# Patient Record
Sex: FEMALE | Race: OTHER | Employment: FULL TIME | URBAN - METROPOLITAN AREA
[De-identification: names, ages, dates, MRNs, and addresses within clinical notes are randomized per-mention and may not be internally consistent; named-entity substitution may affect disease eponyms.]

---

## 2017-03-01 ENCOUNTER — TELEPHONE (OUTPATIENT)
Dept: OBGYN CLINIC | Facility: CLINIC | Age: 37
End: 2017-03-01

## 2017-03-01 NOTE — TELEPHONE ENCOUNTER
Pt confirms message below and LMP of 2/1/17. Pt stated she is currently taking PNV with DHA. Pt had miscarriage in November 2016 and was given the option of doing OBN PC appt or in person appt. Pt stated she will do in person appt.  Pt accepted appt on 3/25

## 2017-03-14 ENCOUNTER — APPOINTMENT (OUTPATIENT)
Dept: LAB | Facility: HOSPITAL | Age: 37
End: 2017-03-14
Attending: OBSTETRICS & GYNECOLOGY
Payer: COMMERCIAL

## 2017-03-14 ENCOUNTER — TELEPHONE (OUTPATIENT)
Dept: OBGYN CLINIC | Facility: CLINIC | Age: 37
End: 2017-03-14

## 2017-03-14 ENCOUNTER — PATIENT MESSAGE (OUTPATIENT)
Dept: OBGYN CLINIC | Facility: CLINIC | Age: 37
End: 2017-03-14

## 2017-03-14 DIAGNOSIS — O26.859 SPOTTING IN EARLY PREGNANCY: Primary | ICD-10-CM

## 2017-03-14 DIAGNOSIS — O26.859 SPOTTING IN EARLY PREGNANCY: ICD-10-CM

## 2017-03-14 LAB — B-HCG SERPL-ACNC: NORMAL MIU/ML

## 2017-03-14 PROCEDURE — 84702 CHORIONIC GONADOTROPIN TEST: CPT

## 2017-03-14 PROCEDURE — 36415 COLL VENOUS BLD VENIPUNCTURE: CPT

## 2017-03-14 NOTE — TELEPHONE ENCOUNTER
From: Molly Kendall  To: Christiano Rooney MD  Sent: 3/14/2017 4:57 PM CDT  Subject: Other    I spoke to the Nurse earlier and she said she was going to ask the on call doctor if i needed to go in for blood work. If I can I would like to do it today.  Please

## 2017-03-14 NOTE — TELEPHONE ENCOUNTER
Pt calling to report brown vaginal spotting after urinating x 2 today. LMP 2/1/17. Pt denies cramping. Pt had D&C in December 2016 for missed Ab. Blood type A+. Routed to [facility] EMERGENCY [facility] on call to please advise. Quants x 2?

## 2017-03-14 NOTE — TELEPHONE ENCOUNTER
From: Buddy Menon  To: Jose Greenfield MD  Sent: 3/14/2017 2:48 PM CDT  Subject: Other    Im spotting a little and I'll be 6 weeks pregnant tomorrow. Is that normal? I'm just more worried since I had the miscarriage back in December.  Thanks

## 2017-03-15 NOTE — TELEPHONE ENCOUNTER
OB US ordered, pt notified and central scheduling # given. Pt to call the day after US for results. Quant cancelled.

## 2017-03-20 ENCOUNTER — HOSPITAL ENCOUNTER (OUTPATIENT)
Dept: ULTRASOUND IMAGING | Facility: HOSPITAL | Age: 37
Discharge: HOME | End: 2017-03-20
Attending: OBSTETRICS & GYNECOLOGY
Payer: COMMERCIAL

## 2017-03-20 DIAGNOSIS — O26.859 SPOTTING IN EARLY PREGNANCY: ICD-10-CM

## 2017-03-20 PROCEDURE — 76801 OB US < 14 WKS SINGLE FETUS: CPT

## 2017-03-20 PROCEDURE — 76817 TRANSVAGINAL US OBSTETRIC: CPT

## 2017-03-21 ENCOUNTER — TELEPHONE (OUTPATIENT)
Dept: OBGYN CLINIC | Facility: CLINIC | Age: 37
End: 2017-03-21

## 2017-03-21 NOTE — TELEPHONE ENCOUNTER
Pt informed US is not resulted yet. Pt advised to call tomorrow or to discuss at appt on Saturday. Pt verbalizes understanding.

## 2017-03-22 ENCOUNTER — PATIENT MESSAGE (OUTPATIENT)
Dept: OBGYN CLINIC | Facility: CLINIC | Age: 37
End: 2017-03-22

## 2017-03-22 ENCOUNTER — TELEPHONE (OUTPATIENT)
Dept: OBGYN CLINIC | Facility: CLINIC | Age: 37
End: 2017-03-22

## 2017-03-22 NOTE — TELEPHONE ENCOUNTER
From: Jose Luis Centeno  To: Darrian Estrada MD  Sent: 3/22/2017 8:27 AM CDT  Subject: Test Results Question    I was wondering if you have my results for my Ultrasound yet? I called yesterday and they told me to check today.

## 2017-03-22 NOTE — TELEPHONE ENCOUNTER
Pt informed of OB US results, viable IUP measuring 6w5d. Pt has OBN scheduled and denies VB and cramping. Will route to JOCE on call for sign off and any further recs. Pt has no further questions.

## 2017-03-25 ENCOUNTER — LAB ENCOUNTER (OUTPATIENT)
Dept: LAB | Facility: HOSPITAL | Age: 37
End: 2017-03-25
Attending: OBSTETRICS & GYNECOLOGY
Payer: COMMERCIAL

## 2017-03-25 ENCOUNTER — NURSE ONLY (OUTPATIENT)
Dept: OBGYN CLINIC | Facility: CLINIC | Age: 37
End: 2017-03-25

## 2017-03-25 ENCOUNTER — TELEPHONE (OUTPATIENT)
Dept: OBGYN CLINIC | Facility: CLINIC | Age: 37
End: 2017-03-25

## 2017-03-25 VITALS — BODY MASS INDEX: 28.96 KG/M2 | WEIGHT: 173.81 LBS | HEIGHT: 65 IN

## 2017-03-25 DIAGNOSIS — Z34.81 ENCOUNTER FOR SUPERVISION OF OTHER NORMAL PREGNANCY IN FIRST TRIMESTER: Primary | ICD-10-CM

## 2017-03-25 DIAGNOSIS — Z34.81 ENCOUNTER FOR SUPERVISION OF OTHER NORMAL PREGNANCY IN FIRST TRIMESTER: ICD-10-CM

## 2017-03-25 LAB
ANTIBODY SCREEN: NEGATIVE
BASOPHILS # BLD: 0 K/UL (ref 0–0.2)
BASOPHILS NFR BLD: 0 %
EOSINOPHIL # BLD: 0.1 K/UL (ref 0–0.7)
EOSINOPHIL NFR BLD: 1 %
ERYTHROCYTE [DISTWIDTH] IN BLOOD BY AUTOMATED COUNT: 12.3 % (ref 11–15)
GLUCOSE 1H P 50 G GLC PO SERPL-MCNC: 144 MG/DL
HCT VFR BLD AUTO: 39.5 % (ref 35–48)
HGB BLD-MCNC: 13.5 G/DL (ref 12–16)
LYMPHOCYTES # BLD: 1.5 K/UL (ref 1–4)
LYMPHOCYTES NFR BLD: 16 %
MCH RBC QN AUTO: 31 PG (ref 27–32)
MCHC RBC AUTO-ENTMCNC: 34.1 G/DL (ref 32–37)
MCV RBC AUTO: 90.8 FL (ref 80–100)
MONOCYTES # BLD: 0.4 K/UL (ref 0–1)
MONOCYTES NFR BLD: 5 %
NEUTROPHILS # BLD AUTO: 7.3 K/UL (ref 1.8–7.7)
NEUTROPHILS NFR BLD: 79 %
PLATELET # BLD AUTO: 185 K/UL (ref 140–400)
PMV BLD AUTO: 9.1 FL (ref 7.4–10.3)
RBC # BLD AUTO: 4.35 M/UL (ref 3.7–5.4)
RH BLOOD TYPE: POSITIVE
RUBV IGG SER-ACNC: 16.3 IU/ML
WBC # BLD AUTO: 9.4 K/UL (ref 4–11)

## 2017-03-25 PROCEDURE — 86803 HEPATITIS C AB TEST: CPT

## 2017-03-25 PROCEDURE — 86900 BLOOD TYPING SEROLOGIC ABO: CPT

## 2017-03-25 PROCEDURE — 87389 HIV-1 AG W/HIV-1&-2 AB AG IA: CPT

## 2017-03-25 PROCEDURE — 82950 GLUCOSE TEST: CPT

## 2017-03-25 PROCEDURE — 36415 COLL VENOUS BLD VENIPUNCTURE: CPT

## 2017-03-25 PROCEDURE — 85025 COMPLETE CBC W/AUTO DIFF WBC: CPT

## 2017-03-25 PROCEDURE — 86850 RBC ANTIBODY SCREEN: CPT

## 2017-03-25 PROCEDURE — 86780 TREPONEMA PALLIDUM: CPT

## 2017-03-25 PROCEDURE — 86762 RUBELLA ANTIBODY: CPT

## 2017-03-25 PROCEDURE — 87086 URINE CULTURE/COLONY COUNT: CPT

## 2017-03-25 PROCEDURE — 86901 BLOOD TYPING SEROLOGIC RH(D): CPT

## 2017-03-25 PROCEDURE — 87340 HEPATITIS B SURFACE AG IA: CPT

## 2017-03-25 NOTE — PROGRESS NOTES
Pt seen for OBN appt today with no complaints. Normal PN labs ordered, plus hep c & 1 hr GTT. Pt advised all labs must be completed and resulted prior to MD appt. Pt accepted new OB appt with SHAYY on 4/10. Pt had some vaginal spotting last week.  Pt ha No    Guzman-Sachs Disease No    Thalassemia No    Other inherited genetic or chromosomal disorders No    Patient or baby's father had a child with birth defects not listed above No    Previous miscarriages or stillborn Yes-maternal aunt had 3 miscarriages.

## 2017-03-25 NOTE — TELEPHONE ENCOUNTER
Pt was seen today for OBN appt and is interested in the FTS. Pt stated she plans on having PN labs completed today.

## 2017-03-27 ENCOUNTER — TELEPHONE (OUTPATIENT)
Dept: OBGYN CLINIC | Facility: CLINIC | Age: 37
End: 2017-03-27

## 2017-03-27 DIAGNOSIS — R73.9 BLOOD GLUCOSE ELEVATED: Primary | ICD-10-CM

## 2017-03-27 LAB
HBV SURFACE AG SERPL QL IA: NONREACTIVE
HCV AB SERPL QL IA: NONREACTIVE
HIV1+2 AB SERPL QL IA: NONREACTIVE
T PALLIDUM AB SER QL: NEGATIVE

## 2017-03-27 NOTE — TELEPHONE ENCOUNTER
Notes Recorded by Johnny Moore MD on 3/27/2017 at 4:28 PM  1 hr glucola 144-- needs 3 hr GTT      Pt informed of JLKs recs and verbalized understanding.  Pt stated that she is on the train and is asking for number for central scheduling be sent to her via My

## 2017-03-28 NOTE — TELEPHONE ENCOUNTER
Patient informed order for fts was routed to [facility] SURGICAL [facility] and she is to call the office if [facility] does not call to schedule her within 1-3 business days.

## 2017-03-29 ENCOUNTER — PATIENT MESSAGE (OUTPATIENT)
Dept: OBGYN CLINIC | Facility: CLINIC | Age: 37
End: 2017-03-29

## 2017-04-02 ENCOUNTER — LAB ENCOUNTER (OUTPATIENT)
Dept: LAB | Facility: HOSPITAL | Age: 37
End: 2017-04-02
Attending: OBSTETRICS & GYNECOLOGY
Payer: COMMERCIAL

## 2017-04-02 DIAGNOSIS — R73.9 BLOOD GLUCOSE ELEVATED: ICD-10-CM

## 2017-04-02 PROCEDURE — 36415 COLL VENOUS BLD VENIPUNCTURE: CPT

## 2017-04-02 PROCEDURE — 82951 GLUCOSE TOLERANCE TEST (GTT): CPT

## 2017-04-02 PROCEDURE — 82952 GTT-ADDED SAMPLES: CPT

## 2017-04-10 ENCOUNTER — INITIAL PRENATAL (OUTPATIENT)
Dept: OBGYN CLINIC | Facility: CLINIC | Age: 37
End: 2017-04-10

## 2017-04-10 VITALS
SYSTOLIC BLOOD PRESSURE: 127 MMHG | HEART RATE: 96 BPM | BODY MASS INDEX: 29 KG/M2 | DIASTOLIC BLOOD PRESSURE: 84 MMHG | WEIGHT: 175 LBS

## 2017-04-10 DIAGNOSIS — Z12.4 SCREENING FOR MALIGNANT NEOPLASM OF CERVIX: ICD-10-CM

## 2017-04-10 DIAGNOSIS — Z34.91 ENCOUNTER FOR SUPERVISION OF NORMAL PREGNANCY IN FIRST TRIMESTER, UNSPECIFIED GRAVIDITY: Primary | ICD-10-CM

## 2017-04-10 DIAGNOSIS — A64 STD (SEXUALLY TRANSMITTED DISEASE): ICD-10-CM

## 2017-04-10 PROBLEM — O09.519 AMA (ADVANCED MATERNAL AGE) PRIMIGRAVIDA 35+: Status: ACTIVE | Noted: 2017-04-10

## 2017-04-13 ENCOUNTER — PATIENT MESSAGE (OUTPATIENT)
Dept: OBGYN CLINIC | Facility: CLINIC | Age: 37
End: 2017-04-13

## 2017-04-14 NOTE — TELEPHONE ENCOUNTER
From: Angelika Riggins  To: Tracy Grigsby MD  Sent: 4/13/2017 9:10 PM CDT  Subject: Test Results Question    I have a question about    Urinalysis w/o   scope resulted on 4/10/17 at 6:45 PM.    i see that my leukocyte detected a trace, what does this mean?  th

## 2017-04-19 ENCOUNTER — PATIENT MESSAGE (OUTPATIENT)
Dept: OBGYN CLINIC | Facility: CLINIC | Age: 37
End: 2017-04-19

## 2017-04-21 ENCOUNTER — TELEPHONE (OUTPATIENT)
Dept: OBGYN CLINIC | Facility: CLINIC | Age: 37
End: 2017-04-21

## 2017-04-21 NOTE — TELEPHONE ENCOUNTER
The office is returning a nurse's call. They would like to know what the pt was diagnosed with. Please advise.

## 2017-04-21 NOTE — TELEPHONE ENCOUNTER
I SPOKE WITH THE PT AND SHE CONFIRMED SHE NEVER HAD ANY OF THE CONDITIONS LISTED UNDER PERTINENT NEGATIVES. LEFT MESSAGE FOR [employer] INDICATING THE PERTINENT NEGATIVES WERE REMOVED FROM THE CHART BECAUSE THEY ARE TRUE BUT MISLEADING.   FAXED A CORRECT COPY O

## 2017-04-21 NOTE — TELEPHONE ENCOUNTER
Calling for cystic fibrosis screening, clarify anesthesia complication diagnosis.  Fax to [phone] attn:eli

## 2017-04-26 ENCOUNTER — TELEPHONE (OUTPATIENT)
Dept: OBGYN CLINIC | Facility: CLINIC | Age: 37
End: 2017-04-26

## 2017-05-03 ENCOUNTER — TELEPHONE (OUTPATIENT)
Dept: OBGYN CLINIC | Facility: CLINIC | Age: 37
End: 2017-05-03

## 2017-05-03 NOTE — TELEPHONE ENCOUNTER
Panorama report dated 5/2/17 placed in [facility]'s folder for review. Copy to brown folder.

## 2017-05-09 ENCOUNTER — TELEPHONE (OUTPATIENT)
Dept: OBGYN CLINIC | Facility: CLINIC | Age: 37
End: 2017-05-09

## 2017-05-09 NOTE — TELEPHONE ENCOUNTER
[facility] Counselor note dated 4/26/17 placed in JLK's folder for review. Copy to brown folder.

## 2017-05-12 ENCOUNTER — ROUTINE PRENATAL (OUTPATIENT)
Dept: OBGYN CLINIC | Facility: CLINIC | Age: 37
End: 2017-05-12

## 2017-05-12 ENCOUNTER — TELEPHONE (OUTPATIENT)
Dept: OBGYN CLINIC | Facility: CLINIC | Age: 37
End: 2017-05-12

## 2017-05-12 VITALS
DIASTOLIC BLOOD PRESSURE: 76 MMHG | SYSTOLIC BLOOD PRESSURE: 114 MMHG | WEIGHT: 173 LBS | HEART RATE: 91 BPM | BODY MASS INDEX: 29 KG/M2

## 2017-05-12 DIAGNOSIS — Z34.02 ENCOUNTER FOR SUPERVISION OF NORMAL FIRST PREGNANCY IN SECOND TRIMESTER: Primary | ICD-10-CM

## 2017-05-15 NOTE — TELEPHONE ENCOUNTER
LM that order and PN documentation faxed to [facility] for level 2 u/s. Pt. To call office with any questions or if she has not heard from [facility] in a few days.

## 2017-06-07 ENCOUNTER — ROUTINE PRENATAL (OUTPATIENT)
Dept: OBGYN CLINIC | Facility: CLINIC | Age: 37
End: 2017-06-07

## 2017-06-07 VITALS
HEART RATE: 76 BPM | BODY MASS INDEX: 29 KG/M2 | WEIGHT: 176 LBS | DIASTOLIC BLOOD PRESSURE: 79 MMHG | SYSTOLIC BLOOD PRESSURE: 124 MMHG

## 2017-06-07 DIAGNOSIS — Z34.02 ENCOUNTER FOR SUPERVISION OF NORMAL FIRST PREGNANCY IN SECOND TRIMESTER: Primary | ICD-10-CM

## 2017-06-08 NOTE — PROGRESS NOTES
Chu at visit. Just starting Logistics job next week. Level 2 scheduled at [facility]. We discussed AMA-38y/o  F/u.

## 2017-06-14 ENCOUNTER — TELEPHONE (OUTPATIENT)
Dept: OBGYN CLINIC | Facility: CLINIC | Age: 37
End: 2017-06-14

## 2017-06-14 NOTE — TELEPHONE ENCOUNTER
Received US report from [facility] [place]. Placed in physicians folder and nurses bin.   Thank you~

## 2017-06-16 ENCOUNTER — TELEPHONE (OUTPATIENT)
Dept: OBGYN CLINIC | Facility: CLINIC | Age: 37
End: 2017-06-16

## 2017-06-16 NOTE — TELEPHONE ENCOUNTER
Pt calling to report that she has been experiencing a cold with a cough for for the past few days. Pt reports a dry non-productive cough & stuffy nose. Pt stated she bought OTC Tylenol Cold & Flu severe and wanted to make sure this is OK to take.  Pt inform

## 2017-06-16 NOTE — TELEPHONE ENCOUNTER
PT STATE SHE HAS A COLD / COUGH / SHE'S ALSO 19 WEEKS / WANT TO KNOW IF THERE'S ANY MED'S THAT SHE CAN TAKE FOR THE SYMPTOMS / PLS ADV

## 2017-07-06 ENCOUNTER — ROUTINE PRENATAL (OUTPATIENT)
Dept: OBGYN CLINIC | Facility: CLINIC | Age: 37
End: 2017-07-06

## 2017-07-06 VITALS
WEIGHT: 181 LBS | DIASTOLIC BLOOD PRESSURE: 69 MMHG | SYSTOLIC BLOOD PRESSURE: 107 MMHG | BODY MASS INDEX: 30 KG/M2 | HEART RATE: 70 BPM

## 2017-07-06 DIAGNOSIS — Z34.92 ENCOUNTER FOR SUPERVISION OF NORMAL PREGNANCY IN SECOND TRIMESTER, UNSPECIFIED GRAVIDITY: Primary | ICD-10-CM

## 2017-07-06 LAB
MULTISTIX LOT#: NORMAL NUMERIC
PH, URINE: 7 (ref 4.5–8)
SPECIFIC GRAVITY: 1 (ref 1–1.03)

## 2017-07-07 ENCOUNTER — TELEPHONE (OUTPATIENT)
Dept: OBGYN CLINIC | Facility: CLINIC | Age: 37
End: 2017-07-07

## 2017-07-07 NOTE — TELEPHONE ENCOUNTER
Received US report from [facility] [place]. Placed in [id number] orange folder; copy placed in nurses folder.   Thank you~

## 2017-08-05 ENCOUNTER — ROUTINE PRENATAL (OUTPATIENT)
Dept: OBGYN CLINIC | Facility: CLINIC | Age: 37
End: 2017-08-05

## 2017-08-05 VITALS
DIASTOLIC BLOOD PRESSURE: 78 MMHG | SYSTOLIC BLOOD PRESSURE: 116 MMHG | BODY MASS INDEX: 30 KG/M2 | HEART RATE: 83 BPM | WEIGHT: 183.19 LBS

## 2017-08-05 DIAGNOSIS — Z3A.26 26 WEEKS GESTATION OF PREGNANCY: Primary | ICD-10-CM

## 2017-08-05 LAB
LEUKOCYTES: 2
MULTISTIX LOT#: NORMAL NUMERIC
PH, URINE: 7.5 (ref 4.5–8)
SPECIFIC GRAVITY: 1.01 (ref 1–1.03)
UROBILINOGEN,SEMI-QN: 0 MG/DL (ref 0–1.9)

## 2017-08-12 ENCOUNTER — APPOINTMENT (OUTPATIENT)
Dept: LAB | Facility: HOSPITAL | Age: 37
End: 2017-08-12
Attending: OBSTETRICS & GYNECOLOGY
Payer: COMMERCIAL

## 2017-08-12 ENCOUNTER — OFFICE VISIT (OUTPATIENT)
Dept: INTERNAL MEDICINE CLINIC | Facility: CLINIC | Age: 37
End: 2017-08-12

## 2017-08-12 ENCOUNTER — PATIENT MESSAGE (OUTPATIENT)
Dept: OBGYN CLINIC | Facility: CLINIC | Age: 37
End: 2017-08-12

## 2017-08-12 VITALS
HEIGHT: 65 IN | BODY MASS INDEX: 30.66 KG/M2 | HEART RATE: 76 BPM | SYSTOLIC BLOOD PRESSURE: 106 MMHG | WEIGHT: 184 LBS | DIASTOLIC BLOOD PRESSURE: 70 MMHG | TEMPERATURE: 98 F

## 2017-08-12 DIAGNOSIS — M79.674 PAIN OF TOE OF RIGHT FOOT: ICD-10-CM

## 2017-08-12 DIAGNOSIS — O28.9 ABNORMAL FINDINGS ON ANTENATAL SCREENING: Primary | ICD-10-CM

## 2017-08-12 DIAGNOSIS — Z00.00 PHYSICAL EXAM, ANNUAL: Primary | ICD-10-CM

## 2017-08-12 DIAGNOSIS — H92.01 EAR PAIN, RIGHT: ICD-10-CM

## 2017-08-12 DIAGNOSIS — Z34.92 ENCOUNTER FOR SUPERVISION OF NORMAL PREGNANCY IN SECOND TRIMESTER, UNSPECIFIED GRAVIDITY: ICD-10-CM

## 2017-08-12 LAB
ERYTHROCYTE [DISTWIDTH] IN BLOOD BY AUTOMATED COUNT: 12.4 % (ref 11–15)
GLUCOSE 1H P 50 G GLC PO SERPL-MCNC: 158 MG/DL
HCT VFR BLD AUTO: 37.3 % (ref 35–48)
HGB BLD-MCNC: 12.7 G/DL (ref 12–16)
MCH RBC QN AUTO: 30.9 PG (ref 27–32)
MCHC RBC AUTO-ENTMCNC: 33.9 G/DL (ref 32–37)
MCV RBC AUTO: 91.2 FL (ref 80–100)
PLATELET # BLD AUTO: 178 K/UL (ref 140–400)
PMV BLD AUTO: 9.6 FL (ref 7.4–10.3)
RBC # BLD AUTO: 4.09 M/UL (ref 3.7–5.4)
WBC # BLD AUTO: 12 K/UL (ref 4–11)

## 2017-08-12 PROCEDURE — 99385 PREV VISIT NEW AGE 18-39: CPT | Performed by: INTERNAL MEDICINE

## 2017-08-12 PROCEDURE — 82950 GLUCOSE TEST: CPT

## 2017-08-12 PROCEDURE — 36415 COLL VENOUS BLD VENIPUNCTURE: CPT

## 2017-08-12 PROCEDURE — 85027 COMPLETE CBC AUTOMATED: CPT

## 2017-08-12 RX ORDER — LORATADINE 10 MG/1
CAPSULE, LIQUID FILLED ORAL
COMMUNITY
End: 2017-12-13

## 2017-08-12 NOTE — PROGRESS NOTES
Yessica Hoffman is a 30year old female.   Patient presents with:  Physical      HPI:   Pt is a 29 yo woman comes as a new pt   C/c physical   C/o right ear pain and right leg pain --got stepped on 2 days ago at the [employer] station -- right foot 3-5 toes   Right 76   Temp 98.2 °F (36.8 °C) (Oral)   Ht 5' 5\" (1.651 m)   Wt 184 lb (83.5 kg)   LMP 02/01/2017 (Exact Date)   BMI 30.62 kg/m²   GENERAL: well developed, well nourished,in no apparent distress, A+O x 3   SKIN: no rashes,no suspicious lesions, tattoo  HEENT

## 2017-08-14 NOTE — TELEPHONE ENCOUNTER
From: Yulia Choudhary  To: Robby Fernando DO  Sent: 8/12/2017 10:18 PM CDT  Subject: Test Results Question    I have a question about GLUCOSE 1HR OB resulted on 8/12/17 at 11:32 AM  I take it my test results were too high, by when would i have to do the 3

## 2017-08-17 ENCOUNTER — ROUTINE PRENATAL (OUTPATIENT)
Dept: OBGYN CLINIC | Facility: CLINIC | Age: 37
End: 2017-08-17

## 2017-08-17 VITALS
HEART RATE: 70 BPM | WEIGHT: 183.38 LBS | BODY MASS INDEX: 31 KG/M2 | DIASTOLIC BLOOD PRESSURE: 73 MMHG | SYSTOLIC BLOOD PRESSURE: 101 MMHG

## 2017-08-17 DIAGNOSIS — Z3A.28 28 WEEKS GESTATION OF PREGNANCY: Primary | ICD-10-CM

## 2017-08-17 LAB
MULTISTIX LOT#: NORMAL NUMERIC
PH, URINE: 8 (ref 4.5–8)
PROTEIN (URINE DIPSTICK): 8 MG/DL
SPECIFIC GRAVITY: 1.01 (ref 1–1.03)
UROBILINOGEN,SEMI-QN: 0 MG/DL (ref 0–1.9)

## 2017-08-17 PROCEDURE — 90471 IMMUNIZATION ADMIN: CPT | Performed by: OBSTETRICS & GYNECOLOGY

## 2017-08-17 PROCEDURE — 90715 TDAP VACCINE 7 YRS/> IM: CPT | Performed by: OBSTETRICS & GYNECOLOGY

## 2017-08-17 NOTE — PROGRESS NOTES
Pt given Tdap in right deltoid. Pt tolerated well. Pt signed consent and given VIS sheet. LOT/EXP [id number], 8-9-19.

## 2017-08-17 NOTE — PROGRESS NOTES
No complaints. Will get order for growth at next visit. 3 hour GTT is scheduled for Sunday. TDAP today. Reviewed kick count and PTL precautions.    RTC 2 wks

## 2017-08-20 ENCOUNTER — LAB ENCOUNTER (OUTPATIENT)
Dept: LAB | Facility: HOSPITAL | Age: 37
End: 2017-08-20
Attending: OBSTETRICS & GYNECOLOGY
Payer: COMMERCIAL

## 2017-08-20 DIAGNOSIS — O28.9 ABNORMAL FINDINGS ON ANTENATAL SCREENING: ICD-10-CM

## 2017-08-20 LAB
GLUCOSE 1H P GLC SERPL-MCNC: 180 MG/DL
GLUCOSE 2H P GLC SERPL-MCNC: 163 MG/DL
GLUCOSE 3H P GLC SERPL-MCNC: 123 MG/DL
GLUCOSE P FAST SERPL-MCNC: 97 MG/DL (ref 70–99)

## 2017-08-20 PROCEDURE — 82951 GLUCOSE TOLERANCE TEST (GTT): CPT

## 2017-08-20 PROCEDURE — 82952 GTT-ADDED SAMPLES: CPT

## 2017-08-20 PROCEDURE — 36415 COLL VENOUS BLD VENIPUNCTURE: CPT

## 2017-08-22 ENCOUNTER — PATIENT MESSAGE (OUTPATIENT)
Dept: OBGYN CLINIC | Facility: CLINIC | Age: 37
End: 2017-08-22

## 2017-08-22 NOTE — TELEPHONE ENCOUNTER
From: [place] Loss  To: Laron Zhao DO  Sent: 8/22/2017 1:44 PM CDT  Subject: Test Results Question    What were my numbers on the glucose test?

## 2017-08-23 PROBLEM — O24.419 GESTATIONAL DIABETES: Status: ACTIVE | Noted: 2017-08-23

## 2017-08-24 ENCOUNTER — TELEPHONE (OUTPATIENT)
Dept: OBGYN CLINIC | Facility: CLINIC | Age: 37
End: 2017-08-24

## 2017-08-24 DIAGNOSIS — O24.419 GESTATIONAL DIABETES MELLITUS (GDM) IN THIRD TRIMESTER, GESTATIONAL DIABETES METHOD OF CONTROL UNSPECIFIED: Primary | ICD-10-CM

## 2017-08-24 NOTE — TELEPHONE ENCOUNTER
----- Message from Cosme Tucker DO sent at 8/23/2017  8:39 AM CDT -----  Please notify patient she had GDM. She needs to see diabetic education. Please coordinate. Thanks!

## 2017-08-25 ENCOUNTER — HOSPITAL ENCOUNTER (OUTPATIENT)
Dept: ENDOCRINOLOGY | Facility: HOSPITAL | Age: 37
Discharge: HOME | End: 2017-08-25
Attending: OBSTETRICS & GYNECOLOGY
Payer: COMMERCIAL

## 2017-08-25 VITALS — BODY MASS INDEX: 31 KG/M2 | WEIGHT: 186.13 LBS

## 2017-08-25 DIAGNOSIS — O24.410 DIET CONTROLLED GESTATIONAL DIABETES MELLITUS (GDM) IN SECOND TRIMESTER: Primary | ICD-10-CM

## 2017-08-25 NOTE — PROGRESS NOTES
Arminda Galdamez  : 1980 was seen for Gestational Diabetes Counseling: Individual/Group    Date: 2017   Start time: 8 AM End time: 9:40 AM      Obtained usual diet history: She eats fairly regularly, 3 meals and multiple snacks per day.  Meals are h glucose and 2 hours after meals   3. Bring glucose log to each MD office visit. 4. Encouraged activity if no restrictions. 5. Encouraged Yonis Guthrie to call diabetes center with any questions or concerns.     Patient verbalized understanding and has no furthe

## 2017-08-28 ENCOUNTER — TELEPHONE (OUTPATIENT)
Dept: OBGYN CLINIC | Facility: CLINIC | Age: 37
End: 2017-08-28

## 2017-08-28 ENCOUNTER — PATIENT MESSAGE (OUTPATIENT)
Dept: OBGYN CLINIC | Facility: CLINIC | Age: 37
End: 2017-08-28

## 2017-08-28 NOTE — TELEPHONE ENCOUNTER
Pt informed of MAZs recs and verbalized understanding. Pt offered appts for today but declined due to work schedule. Pt offered appt for tomorrow morning but declined. Pt accepted appt tomorrow afternoon with STEFFI at 4:50pm. Pt to call if sx's worsen.

## 2017-08-28 NOTE — TELEPHONE ENCOUNTER
Pt 29w5d calling to report vaginal burning that started on Saturday. Pt stated that the burning is on the outside of vagina and does not relate to urination. Pt also reports vaginal irritation. Pt denies urinary urgency or frequency.  Pt states that she fee

## 2017-08-28 NOTE — TELEPHONE ENCOUNTER
From: Iesha Whelan  To: Kj Banegas MD  Sent: 8/28/2017 12:12 PM CDT  Subject: Non-Urgent Medical Question    I've been having burning in my vagina the last 3 days, its not only when I pee but all the time.  Is this normal with me being at almost 30 we

## 2017-08-29 ENCOUNTER — ROUTINE PRENATAL (OUTPATIENT)
Dept: OBGYN CLINIC | Facility: CLINIC | Age: 37
End: 2017-08-29

## 2017-08-29 ENCOUNTER — TELEPHONE (OUTPATIENT)
Dept: OBGYN CLINIC | Facility: CLINIC | Age: 37
End: 2017-08-29

## 2017-08-29 ENCOUNTER — APPOINTMENT (OUTPATIENT)
Dept: ENDOCRINOLOGY | Facility: HOSPITAL | Age: 37
End: 2017-08-29
Attending: OBSTETRICS & GYNECOLOGY
Payer: COMMERCIAL

## 2017-08-29 VITALS
SYSTOLIC BLOOD PRESSURE: 116 MMHG | BODY MASS INDEX: 31 KG/M2 | HEART RATE: 91 BPM | DIASTOLIC BLOOD PRESSURE: 78 MMHG | WEIGHT: 186 LBS

## 2017-08-29 DIAGNOSIS — O24.419 GESTATIONAL DIABETES MELLITUS (GDM) IN THIRD TRIMESTER, GESTATIONAL DIABETES METHOD OF CONTROL UNSPECIFIED: Primary | ICD-10-CM

## 2017-08-29 DIAGNOSIS — N76.0 VAGINITIS AND VULVOVAGINITIS: ICD-10-CM

## 2017-08-29 DIAGNOSIS — Z34.93 ENCOUNTER FOR SUPERVISION OF NORMAL PREGNANCY IN THIRD TRIMESTER, UNSPECIFIED GRAVIDITY: Primary | ICD-10-CM

## 2017-08-29 LAB
MULTISTIX LOT#: NORMAL NUMERIC
PH, URINE: 6.5 (ref 4.5–8)
SPECIFIC GRAVITY: 1.01 (ref 1–1.03)
UROBILINOGEN,SEMI-QN: 0.2 MG/DL (ref 0–1.9)

## 2017-08-29 PROCEDURE — 99213 OFFICE O/P EST LOW 20 MIN: CPT | Performed by: OBSTETRICS & GYNECOLOGY

## 2017-08-29 PROCEDURE — 81002 URINALYSIS NONAUTO W/O SCOPE: CPT | Performed by: OBSTETRICS & GYNECOLOGY

## 2017-08-29 NOTE — TELEPHONE ENCOUNTER
Patient with BS log at visit. Fasting  and PP dinner 128-133. Will start patient on 0+0+2+4N. She will need education on taking insulin as she has never done this before. Can you help arrange this.  Pt needs to send logs in three more days

## 2017-08-29 NOTE — TELEPHONE ENCOUNTER
Pt informed order has been sent to Diabetes Ed. Pt to call asap for appt. Pt advised will need to send sugars in every 3 days, by phone, fax or my chart. Pt verbalizes understanding.

## 2017-08-29 NOTE — PROGRESS NOTES
Problem visit:  Pt complains of vaginal burning. Denies Dysuria at this. No abnormal discharge or odor. She has BS logs with elevated fasting and PP dinner. Start insulin 0+0+2+4N and needs diabetic ed to teach her insulin. Denies contractions.  Good fetal

## 2017-08-31 LAB
GENITAL VAGINOSIS SCREEN: NEGATIVE
TRICHOMONAS SCREEN: NEGATIVE

## 2017-09-01 ENCOUNTER — HOSPITAL ENCOUNTER (OUTPATIENT)
Dept: ENDOCRINOLOGY | Facility: HOSPITAL | Age: 37
Discharge: HOME | End: 2017-09-01
Attending: OBSTETRICS & GYNECOLOGY
Payer: COMMERCIAL

## 2017-09-01 ENCOUNTER — PATIENT MESSAGE (OUTPATIENT)
Dept: OBGYN CLINIC | Facility: CLINIC | Age: 37
End: 2017-09-01

## 2017-09-01 VITALS — WEIGHT: 187.81 LBS | BODY MASS INDEX: 31 KG/M2

## 2017-09-01 DIAGNOSIS — O24.410 DIET CONTROLLED GESTATIONAL DIABETES MELLITUS (GDM) IN SECOND TRIMESTER: Primary | ICD-10-CM

## 2017-09-01 RX ORDER — BLOOD SUGAR DIAGNOSTIC
1 STRIP MISCELLANEOUS NIGHTLY
Qty: 1 BOX | Refills: 1 | Status: SHIPPED | OUTPATIENT
Start: 2017-09-01 | End: 2017-11-08

## 2017-09-01 RX ORDER — FLUCONAZOLE 150 MG/1
150 TABLET ORAL ONCE
Qty: 1 TABLET | Refills: 0 | Status: SHIPPED | OUTPATIENT
Start: 2017-09-01 | End: 2017-09-01

## 2017-09-01 NOTE — PROGRESS NOTES
Fabián Valentine  : 1980 was seen for Injection Instruction:    Date: 2017 Start time: 3:20 pm End time: 4:00 pm    Wt 187 lb 12.8 oz   LMP 2017 (Exact Date)   BMI 31.25 kg/m²     Reporting FBS:  mg/dl, PP B: , PP L: , PP D:

## 2017-09-01 NOTE — TELEPHONE ENCOUNTER
From: Sana Party  To: Ravin El MD  Sent: 9/1/2017 1:54 PM CDT  Subject: Test Results Question    I was wondering if Doctor Loretta Hebert was able to see my test results yet from Tuesday?  Thanks

## 2017-09-05 ENCOUNTER — ROUTINE PRENATAL (OUTPATIENT)
Dept: OBGYN CLINIC | Facility: CLINIC | Age: 37
End: 2017-09-05

## 2017-09-05 VITALS
DIASTOLIC BLOOD PRESSURE: 69 MMHG | BODY MASS INDEX: 31 KG/M2 | HEART RATE: 92 BPM | WEIGHT: 185 LBS | SYSTOLIC BLOOD PRESSURE: 108 MMHG

## 2017-09-05 DIAGNOSIS — O24.414 INSULIN CONTROLLED GESTATIONAL DIABETES MELLITUS (GDM) IN THIRD TRIMESTER: ICD-10-CM

## 2017-09-05 DIAGNOSIS — Z34.93 ENCOUNTER FOR SUPERVISION OF NORMAL PREGNANCY IN THIRD TRIMESTER, UNSPECIFIED GRAVIDITY: Primary | ICD-10-CM

## 2017-09-05 LAB
MULTISTIX LOT#: NORMAL NUMERIC
PH, URINE: 6 (ref 4.5–8)
SPECIFIC GRAVITY: 1.02 (ref 1–1.03)

## 2017-09-05 NOTE — PROGRESS NOTES
No complaints. Logs with better control. Not enough values so to send them in two more days. Growth US ordered   Reviewed kick counts and labor precautions.    RTC 2 wks

## 2017-09-07 ENCOUNTER — APPOINTMENT (OUTPATIENT)
Dept: ENDOCRINOLOGY | Facility: HOSPITAL | Age: 37
End: 2017-09-07
Attending: OBSTETRICS & GYNECOLOGY
Payer: COMMERCIAL

## 2017-09-11 ENCOUNTER — HOSPITAL ENCOUNTER (OUTPATIENT)
Dept: ULTRASOUND IMAGING | Facility: HOSPITAL | Age: 37
Discharge: HOME | End: 2017-09-11
Attending: OBSTETRICS & GYNECOLOGY
Payer: COMMERCIAL

## 2017-09-11 DIAGNOSIS — O24.414 INSULIN CONTROLLED GESTATIONAL DIABETES MELLITUS (GDM) IN THIRD TRIMESTER: ICD-10-CM

## 2017-09-11 PROCEDURE — 76816 OB US FOLLOW-UP PER FETUS: CPT | Performed by: OBSTETRICS & GYNECOLOGY

## 2017-09-21 ENCOUNTER — APPOINTMENT (OUTPATIENT)
Dept: OBGYN CLINIC | Facility: HOSPITAL | Age: 37
End: 2017-09-21
Payer: COMMERCIAL

## 2017-09-21 ENCOUNTER — ROUTINE PRENATAL (OUTPATIENT)
Dept: OBGYN CLINIC | Facility: CLINIC | Age: 37
End: 2017-09-21

## 2017-09-21 ENCOUNTER — HOSPITAL ENCOUNTER (OUTPATIENT)
Facility: HOSPITAL | Age: 37
Discharge: HOME | End: 2017-09-21
Attending: OBSTETRICS & GYNECOLOGY | Admitting: OBSTETRICS & GYNECOLOGY
Payer: COMMERCIAL

## 2017-09-21 VITALS
SYSTOLIC BLOOD PRESSURE: 106 MMHG | HEART RATE: 79 BPM | DIASTOLIC BLOOD PRESSURE: 72 MMHG | RESPIRATION RATE: 16 BRPM | TEMPERATURE: 98 F

## 2017-09-21 VITALS
DIASTOLIC BLOOD PRESSURE: 79 MMHG | WEIGHT: 189 LBS | BODY MASS INDEX: 31 KG/M2 | HEART RATE: 103 BPM | SYSTOLIC BLOOD PRESSURE: 116 MMHG

## 2017-09-21 DIAGNOSIS — Z34.93 ENCOUNTER FOR SUPERVISION OF NORMAL PREGNANCY IN THIRD TRIMESTER, UNSPECIFIED GRAVIDITY: Primary | ICD-10-CM

## 2017-09-21 LAB
LEUKOCYTES: 2
MULTISTIX LOT#: NORMAL NUMERIC
PH, URINE: 7.5 (ref 4.5–8)
SPECIFIC GRAVITY: 1.01 (ref 1–1.03)
UROBILINOGEN,SEMI-QN: 0.2 MG/DL (ref 0–1.9)

## 2017-09-21 PROCEDURE — 59025 FETAL NON-STRESS TEST: CPT | Performed by: OBSTETRICS & GYNECOLOGY

## 2017-09-21 NOTE — PROGRESS NOTES
NO issues. To [facility] for NST after appt. Didn't bring her BS log. Currently on 0+2+6+6N. Growth 55%. RTC 2wks.

## 2017-09-21 NOTE — NST
Nonstress Test   Patient: Garland Larger    Gestation: 33w1d    NST:       Variability: Moderate           Accelerations: Yes           Decelerations: None            Baseline: 135 BPM           Uterine Irritability: No           Contractions: Not present

## 2017-09-26 ENCOUNTER — PATIENT MESSAGE (OUTPATIENT)
Dept: OBGYN CLINIC | Facility: CLINIC | Age: 37
End: 2017-09-26

## 2017-09-26 NOTE — TELEPHONE ENCOUNTER
From: Lloyd Parra  To: Ankit Beckwith DO  Sent: 9/26/2017 8:38 AM CDT  Subject: Other    Glucose Numbers  09/22- breakfast-91  09/22- lunch-97  09/2265-wjuhjd-671  09/23-wake-85  09/2343-kvkfxflqb-71  09/23-lunch-84  09/2327-hqetex-170  09/24-wake-98  09/24

## 2017-09-27 ENCOUNTER — HOSPITAL ENCOUNTER (OUTPATIENT)
Dept: OBGYN CLINIC | Facility: HOSPITAL | Age: 37
Discharge: HOME | End: 2017-09-27
Payer: COMMERCIAL

## 2017-09-27 ENCOUNTER — HOSPITAL ENCOUNTER (OUTPATIENT)
Facility: HOSPITAL | Age: 37
Discharge: HOME | End: 2017-09-27
Attending: OBSTETRICS & GYNECOLOGY | Admitting: OBSTETRICS & GYNECOLOGY
Payer: COMMERCIAL

## 2017-09-27 VITALS — HEART RATE: 76 BPM | SYSTOLIC BLOOD PRESSURE: 110 MMHG | DIASTOLIC BLOOD PRESSURE: 69 MMHG

## 2017-09-27 PROCEDURE — 59025 FETAL NON-STRESS TEST: CPT | Performed by: OBSTETRICS & GYNECOLOGY

## 2017-09-27 NOTE — NST
Nonstress Test   Patient: Ane Dross    Gestation: 34w0d    NST: SCHEDULED NST FOR AMA AND A2GDM       Variability: Moderate           Accelerations: Yes           Decelerations: None            Baseline: 135 BPM           Uterine Irritability: No

## 2017-10-04 ENCOUNTER — HOSPITAL ENCOUNTER (OUTPATIENT)
Dept: OBGYN CLINIC | Facility: HOSPITAL | Age: 37
Discharge: HOME | End: 2017-10-04
Payer: COMMERCIAL

## 2017-10-04 ENCOUNTER — HOSPITAL ENCOUNTER (OUTPATIENT)
Facility: HOSPITAL | Age: 37
Discharge: HOME | End: 2017-10-04
Attending: OBSTETRICS & GYNECOLOGY | Admitting: OBSTETRICS & GYNECOLOGY
Payer: COMMERCIAL

## 2017-10-04 VITALS — RESPIRATION RATE: 20 BRPM | SYSTOLIC BLOOD PRESSURE: 116 MMHG | HEART RATE: 77 BPM | DIASTOLIC BLOOD PRESSURE: 78 MMHG

## 2017-10-04 PROCEDURE — 59025 FETAL NON-STRESS TEST: CPT | Performed by: OBSTETRICS & GYNECOLOGY

## 2017-10-05 NOTE — NST
Nonstress Test   Patient: Sana Juarez    Gestation: 35w0d    NST: SCHEDULED NST FOR AMA AND A2GDM         Variability: Moderate           Accelerations: Yes           Decelerations: None            Baseline: 125 BPM           Uterine Irritability: Yes

## 2017-10-07 ENCOUNTER — APPOINTMENT (OUTPATIENT)
Dept: LAB | Facility: HOSPITAL | Age: 37
End: 2017-10-07
Attending: OBSTETRICS & GYNECOLOGY
Payer: COMMERCIAL

## 2017-10-07 ENCOUNTER — ROUTINE PRENATAL (OUTPATIENT)
Dept: OBGYN CLINIC | Facility: CLINIC | Age: 37
End: 2017-10-07

## 2017-10-07 VITALS
HEART RATE: 83 BPM | BODY MASS INDEX: 32 KG/M2 | WEIGHT: 190.19 LBS | DIASTOLIC BLOOD PRESSURE: 76 MMHG | SYSTOLIC BLOOD PRESSURE: 101 MMHG

## 2017-10-07 DIAGNOSIS — O24.419 GDM, CLASS A2: ICD-10-CM

## 2017-10-07 DIAGNOSIS — Z34.93 ENCOUNTER FOR SUPERVISION OF NORMAL PREGNANCY IN THIRD TRIMESTER, UNSPECIFIED GRAVIDITY: ICD-10-CM

## 2017-10-07 DIAGNOSIS — Z34.93 ENCOUNTER FOR SUPERVISION OF NORMAL PREGNANCY IN THIRD TRIMESTER, UNSPECIFIED GRAVIDITY: Primary | ICD-10-CM

## 2017-10-07 PROCEDURE — 85027 COMPLETE CBC AUTOMATED: CPT

## 2017-10-07 PROCEDURE — 86780 TREPONEMA PALLIDUM: CPT

## 2017-10-07 PROCEDURE — 36415 COLL VENOUS BLD VENIPUNCTURE: CPT

## 2017-10-07 PROCEDURE — 90686 IIV4 VACC NO PRSV 0.5 ML IM: CPT | Performed by: OBSTETRICS & GYNECOLOGY

## 2017-10-07 PROCEDURE — 90471 IMMUNIZATION ADMIN: CPT | Performed by: OBSTETRICS & GYNECOLOGY

## 2017-10-07 NOTE — PROGRESS NOTES
NO issues reported. BS log reviewed yesterday. Currentlty  On 0+2+8+10N. GBS collected. Flu  Shot today. RTC 1 wk. Growth ordered.

## 2017-10-11 ENCOUNTER — HOSPITAL ENCOUNTER (OUTPATIENT)
Dept: ULTRASOUND IMAGING | Facility: HOSPITAL | Age: 37
Discharge: HOME | End: 2017-10-11
Attending: OBSTETRICS & GYNECOLOGY
Payer: COMMERCIAL

## 2017-10-11 ENCOUNTER — APPOINTMENT (OUTPATIENT)
Dept: OBGYN CLINIC | Facility: HOSPITAL | Age: 37
End: 2017-10-11
Payer: COMMERCIAL

## 2017-10-11 ENCOUNTER — HOSPITAL ENCOUNTER (OUTPATIENT)
Facility: HOSPITAL | Age: 37
Discharge: HOME | End: 2017-10-11
Attending: OBSTETRICS & GYNECOLOGY | Admitting: OBSTETRICS & GYNECOLOGY
Payer: COMMERCIAL

## 2017-10-11 DIAGNOSIS — Z34.93 ENCOUNTER FOR SUPERVISION OF NORMAL PREGNANCY IN THIRD TRIMESTER, UNSPECIFIED GRAVIDITY: ICD-10-CM

## 2017-10-11 DIAGNOSIS — O24.419 GDM, CLASS A2: ICD-10-CM

## 2017-10-11 PROCEDURE — 76816 OB US FOLLOW-UP PER FETUS: CPT | Performed by: OBSTETRICS & GYNECOLOGY

## 2017-10-11 NOTE — NST
Nonstress Test   Patient: Natalie Inman    Gestation: 36w0d    NST:a2gdm       Variability: Moderate           Accelerations: Yes           Decelerations: None            Baseline: 130 BPM           Uterine Irritability: Yes           Contractions: Irregular

## 2017-10-14 ENCOUNTER — ROUTINE PRENATAL (OUTPATIENT)
Dept: OBGYN CLINIC | Facility: CLINIC | Age: 37
End: 2017-10-14

## 2017-10-14 VITALS
HEART RATE: 87 BPM | WEIGHT: 194 LBS | BODY MASS INDEX: 32 KG/M2 | DIASTOLIC BLOOD PRESSURE: 72 MMHG | SYSTOLIC BLOOD PRESSURE: 103 MMHG

## 2017-10-14 DIAGNOSIS — Z34.93 ENCOUNTER FOR SUPERVISION OF NORMAL PREGNANCY IN THIRD TRIMESTER, UNSPECIFIED GRAVIDITY: Primary | ICD-10-CM

## 2017-10-18 ENCOUNTER — HOSPITAL ENCOUNTER (OUTPATIENT)
Facility: HOSPITAL | Age: 37
Discharge: HOME | End: 2017-10-18
Attending: OBSTETRICS & GYNECOLOGY | Admitting: OBSTETRICS & GYNECOLOGY
Payer: COMMERCIAL

## 2017-10-18 ENCOUNTER — HOSPITAL ENCOUNTER (OUTPATIENT)
Dept: OBGYN CLINIC | Facility: HOSPITAL | Age: 37
Discharge: HOME | End: 2017-10-18
Payer: COMMERCIAL

## 2017-10-18 VITALS — DIASTOLIC BLOOD PRESSURE: 76 MMHG | SYSTOLIC BLOOD PRESSURE: 106 MMHG | HEART RATE: 82 BPM

## 2017-10-18 NOTE — NST
Nonstress Test   Patient: Patsey Babinski    Gestation: 37w0d    NST: SCHEDULED NST FOR AMA AND A2GDM       Variability: Moderate           Accelerations: Yes           Decelerations: None            Baseline: 125 BPM           Uterine Irritability: No

## 2017-10-21 ENCOUNTER — ROUTINE PRENATAL (OUTPATIENT)
Dept: OBGYN CLINIC | Facility: CLINIC | Age: 37
End: 2017-10-21

## 2017-10-21 VITALS
WEIGHT: 193.81 LBS | SYSTOLIC BLOOD PRESSURE: 133 MMHG | DIASTOLIC BLOOD PRESSURE: 85 MMHG | BODY MASS INDEX: 32 KG/M2 | HEART RATE: 89 BPM

## 2017-10-21 DIAGNOSIS — Z34.93 ENCOUNTER FOR SUPERVISION OF NORMAL PREGNANCY IN THIRD TRIMESTER, UNSPECIFIED GRAVIDITY: Primary | ICD-10-CM

## 2017-10-21 PROBLEM — B95.1 POSITIVE GBS TEST: Status: ACTIVE | Noted: 2017-10-21

## 2017-10-25 ENCOUNTER — PATIENT MESSAGE (OUTPATIENT)
Dept: OBGYN CLINIC | Facility: CLINIC | Age: 37
End: 2017-10-25

## 2017-10-25 ENCOUNTER — HOSPITAL ENCOUNTER (OUTPATIENT)
Facility: HOSPITAL | Age: 37
Discharge: HOME | End: 2017-10-25
Attending: OBSTETRICS & GYNECOLOGY | Admitting: OBSTETRICS & GYNECOLOGY
Payer: COMMERCIAL

## 2017-10-25 ENCOUNTER — APPOINTMENT (OUTPATIENT)
Dept: OBGYN CLINIC | Facility: HOSPITAL | Age: 37
End: 2017-10-25
Payer: COMMERCIAL

## 2017-10-25 VITALS — SYSTOLIC BLOOD PRESSURE: 106 MMHG | HEART RATE: 74 BPM | DIASTOLIC BLOOD PRESSURE: 76 MMHG

## 2017-10-25 NOTE — TELEPHONE ENCOUNTER
From: Deisy Baez  To: Denton Robert MD  Sent: 10/25/2017 10:13 AM CDT  Subject: Non-Urgent Medical Question    Sugar Levels    10/20 -Breakfast-83  10/20- NXSIW-157  10/20- Dinner-107  10/21- Nika Gamboa  10/21- Breakfast-79  10/21- Lunch-100  10/21-Dinner

## 2017-10-25 NOTE — NST
Nonstress Test   Patient: Arminda Galdamez    Gestation: 38w0d    NST: SCHEDULED NST FOR AMA AND A2GDM       Variability: Moderate           Accelerations: Yes           Decelerations: None            Baseline: 120 BPM           Uterine Irritability: No

## 2017-10-26 ENCOUNTER — OFFICE VISIT (OUTPATIENT)
Dept: OBGYN CLINIC | Facility: CLINIC | Age: 37
End: 2017-10-26

## 2017-10-26 VITALS
SYSTOLIC BLOOD PRESSURE: 129 MMHG | BODY MASS INDEX: 32 KG/M2 | WEIGHT: 194.81 LBS | DIASTOLIC BLOOD PRESSURE: 85 MMHG | HEART RATE: 84 BPM

## 2017-10-26 DIAGNOSIS — Z34.93 ENCOUNTER FOR SUPERVISION OF NORMAL PREGNANCY IN THIRD TRIMESTER, UNSPECIFIED GRAVIDITY: Primary | ICD-10-CM

## 2017-10-26 NOTE — PROGRESS NOTES
NO issues. BS log sent earlier. Insulin 0+2+10+14N. F/u 1 week for visit. IOL changed to Wednesday AM given favorable cx.

## 2017-10-31 ENCOUNTER — OFFICE VISIT (OUTPATIENT)
Dept: OBGYN CLINIC | Facility: CLINIC | Age: 37
End: 2017-10-31

## 2017-10-31 VITALS
BODY MASS INDEX: 33 KG/M2 | SYSTOLIC BLOOD PRESSURE: 123 MMHG | WEIGHT: 198 LBS | HEART RATE: 92 BPM | DIASTOLIC BLOOD PRESSURE: 80 MMHG

## 2017-10-31 DIAGNOSIS — Z34.93 ENCOUNTER FOR SUPERVISION OF NORMAL PREGNANCY IN THIRD TRIMESTER, UNSPECIFIED GRAVIDITY: Primary | ICD-10-CM

## 2017-10-31 NOTE — PROGRESS NOTES
No issues reported. IOL in AM for A2GDM. Cx 3-4/60/-3. Insulin at 0+2+10+14N. GBS+. IOL plans reviewed.

## 2017-11-01 ENCOUNTER — ANESTHESIA (OUTPATIENT)
Dept: OBGYN UNIT | Facility: HOSPITAL | Age: 37
End: 2017-11-01
Payer: COMMERCIAL

## 2017-11-01 ENCOUNTER — ANESTHESIA EVENT (OUTPATIENT)
Dept: OBGYN UNIT | Facility: HOSPITAL | Age: 37
End: 2017-11-01
Payer: COMMERCIAL

## 2017-11-01 ENCOUNTER — HOSPITAL ENCOUNTER (INPATIENT)
Facility: HOSPITAL | Age: 37
LOS: 3 days | Discharge: HOME | End: 2017-11-04
Attending: OBSTETRICS & GYNECOLOGY | Admitting: OBSTETRICS & GYNECOLOGY
Payer: COMMERCIAL

## 2017-11-01 ENCOUNTER — HOSPITAL ENCOUNTER (INPATIENT)
Dept: OBGYN CLINIC | Facility: HOSPITAL | Age: 37
Discharge: HOME | End: 2017-11-01
Attending: OBSTETRICS & GYNECOLOGY
Payer: COMMERCIAL

## 2017-11-01 DIAGNOSIS — Z37.9 NORMAL LABOR: Primary | ICD-10-CM

## 2017-11-01 PROBLEM — Z34.90 PREGNANCY: Status: ACTIVE | Noted: 2017-11-01

## 2017-11-01 PROCEDURE — 3E033VJ INTRODUCTION OF OTHER HORMONE INTO PERIPHERAL VEIN, PERCUTANEOUS APPROACH: ICD-10-PCS | Performed by: OBSTETRICS & GYNECOLOGY

## 2017-11-01 RX ORDER — EPHEDRINE SULFATE/0.9% NACL/PF 25 MG/5 ML
SYRINGE (ML) INTRAVENOUS
Status: DISPENSED
Start: 2017-11-01 | End: 2017-11-02

## 2017-11-01 RX ORDER — TRISODIUM CITRATE DIHYDRATE AND CITRIC ACID MONOHYDRATE 500; 334 MG/5ML; MG/5ML
30 SOLUTION ORAL AS NEEDED
Status: DISCONTINUED | OUTPATIENT
Start: 2017-11-01 | End: 2017-11-02 | Stop reason: HOSPADM

## 2017-11-01 RX ORDER — SODIUM CHLORIDE 0.9 % (FLUSH) 0.9 %
10 SYRINGE (ML) INJECTION AS NEEDED
Status: DISCONTINUED | OUTPATIENT
Start: 2017-11-01 | End: 2017-11-02 | Stop reason: HOSPADM

## 2017-11-01 RX ORDER — DEXTROSE, SODIUM CHLORIDE, SODIUM LACTATE, POTASSIUM CHLORIDE, AND CALCIUM CHLORIDE 5; .6; .31; .03; .02 G/100ML; G/100ML; G/100ML; G/100ML; G/100ML
125 INJECTION, SOLUTION INTRAVENOUS CONTINUOUS
Status: DISCONTINUED | OUTPATIENT
Start: 2017-11-01 | End: 2017-11-02 | Stop reason: HOSPADM

## 2017-11-01 RX ORDER — TERBUTALINE SULFATE 1 MG/ML
0.25 INJECTION, SOLUTION SUBCUTANEOUS AS NEEDED
Status: DISCONTINUED | OUTPATIENT
Start: 2017-11-01 | End: 2017-11-02 | Stop reason: HOSPADM

## 2017-11-01 RX ORDER — BUPIVACAINE HYDROCHLORIDE 2.5 MG/ML
INJECTION, SOLUTION EPIDURAL; INFILTRATION; INTRACAUDAL
Status: DISPENSED
Start: 2017-11-01 | End: 2017-11-02

## 2017-11-01 RX ORDER — BUPIVACAINE HYDROCHLORIDE 2.5 MG/ML
INJECTION, SOLUTION EPIDURAL; INFILTRATION; INTRACAUDAL AS NEEDED
Status: DISCONTINUED | OUTPATIENT
Start: 2017-11-01 | End: 2017-11-02 | Stop reason: SURG

## 2017-11-01 RX ORDER — NALBUPHINE HCL 10 MG/ML
2.5 AMPUL (ML) INJECTION
Status: DISCONTINUED | OUTPATIENT
Start: 2017-11-01 | End: 2017-11-04

## 2017-11-01 RX ORDER — PHENYLEPHRINE HCL IN 0.9% NACL 0.5 MG/5ML
SYRINGE (ML) INTRAVENOUS
Status: DISPENSED
Start: 2017-11-01 | End: 2017-11-02

## 2017-11-01 RX ORDER — FAMOTIDINE 10 MG/ML
INJECTION, SOLUTION INTRAVENOUS
Status: DISCONTINUED
Start: 2017-11-01 | End: 2017-11-02 | Stop reason: WASHOUT

## 2017-11-01 RX ORDER — ONDANSETRON 2 MG/ML
4 INJECTION INTRAMUSCULAR; INTRAVENOUS EVERY 6 HOURS PRN
Status: DISCONTINUED | OUTPATIENT
Start: 2017-11-01 | End: 2017-11-02 | Stop reason: HOSPADM

## 2017-11-01 RX ORDER — SODIUM CHLORIDE, SODIUM LACTATE, POTASSIUM CHLORIDE, CALCIUM CHLORIDE 600; 310; 30; 20 MG/100ML; MG/100ML; MG/100ML; MG/100ML
INJECTION, SOLUTION INTRAVENOUS
Status: COMPLETED
Start: 2017-11-01 | End: 2017-11-01

## 2017-11-01 RX ORDER — IBUPROFEN 600 MG/1
600 TABLET ORAL ONCE AS NEEDED
Status: DISCONTINUED | OUTPATIENT
Start: 2017-11-01 | End: 2017-11-02 | Stop reason: HOSPADM

## 2017-11-01 RX ORDER — LIDOCAINE HYDROCHLORIDE AND EPINEPHRINE 15; 5 MG/ML; UG/ML
INJECTION, SOLUTION EPIDURAL AS NEEDED
Status: DISCONTINUED | OUTPATIENT
Start: 2017-11-01 | End: 2017-11-02 | Stop reason: SURG

## 2017-11-01 RX ORDER — EPHEDRINE SULFATE/0.9% NACL/PF 25 MG/5 ML
5 SYRINGE (ML) INTRAVENOUS AS NEEDED
Status: DISCONTINUED | OUTPATIENT
Start: 2017-11-01 | End: 2017-11-04

## 2017-11-01 RX ORDER — DEXTROSE, SODIUM CHLORIDE, SODIUM LACTATE, POTASSIUM CHLORIDE, AND CALCIUM CHLORIDE 5; .6; .31; .03; .02 G/100ML; G/100ML; G/100ML; G/100ML; G/100ML
INJECTION, SOLUTION INTRAVENOUS
Status: DISPENSED
Start: 2017-11-01 | End: 2017-11-01

## 2017-11-01 RX ORDER — SODIUM CHLORIDE, SODIUM LACTATE, POTASSIUM CHLORIDE, CALCIUM CHLORIDE 600; 310; 30; 20 MG/100ML; MG/100ML; MG/100ML; MG/100ML
INJECTION, SOLUTION INTRAVENOUS CONTINUOUS
Status: DISCONTINUED | OUTPATIENT
Start: 2017-11-01 | End: 2017-11-04

## 2017-11-01 RX ORDER — LIDOCAINE HYDROCHLORIDE 10 MG/ML
30 INJECTION, SOLUTION EPIDURAL; INFILTRATION; INTRACAUDAL; PERINEURAL ONCE
Status: DISCONTINUED | OUTPATIENT
Start: 2017-11-01 | End: 2017-11-02 | Stop reason: HOSPADM

## 2017-11-01 RX ORDER — TERBUTALINE SULFATE 1 MG/ML
INJECTION, SOLUTION SUBCUTANEOUS
Status: DISPENSED
Start: 2017-11-01 | End: 2017-11-02

## 2017-11-01 RX ORDER — LIDOCAINE HYDROCHLORIDE 10 MG/ML
INJECTION, SOLUTION EPIDURAL; INFILTRATION; INTRACAUDAL; PERINEURAL AS NEEDED
Status: DISCONTINUED | OUTPATIENT
Start: 2017-11-01 | End: 2017-11-02 | Stop reason: SURG

## 2017-11-01 RX ORDER — AMMONIA INHALANTS 0.04 G/.3ML
0.3 INHALANT RESPIRATORY (INHALATION) AS NEEDED
Status: DISCONTINUED | OUTPATIENT
Start: 2017-11-01 | End: 2017-11-02 | Stop reason: HOSPADM

## 2017-11-01 RX ORDER — METOCLOPRAMIDE HYDROCHLORIDE 5 MG/ML
INJECTION INTRAMUSCULAR; INTRAVENOUS
Status: DISCONTINUED
Start: 2017-11-01 | End: 2017-11-02 | Stop reason: WASHOUT

## 2017-11-01 RX ADMIN — LIDOCAINE HYDROCHLORIDE 3 ML: 10 INJECTION, SOLUTION EPIDURAL; INFILTRATION; INTRACAUDAL; PERINEURAL at 15:49:00

## 2017-11-01 RX ADMIN — BUPIVACAINE HYDROCHLORIDE 3 ML: 2.5 INJECTION, SOLUTION EPIDURAL; INFILTRATION; INTRACAUDAL at 15:56:00

## 2017-11-01 RX ADMIN — LIDOCAINE HYDROCHLORIDE AND EPINEPHRINE 3 ML: 15; 5 INJECTION, SOLUTION EPIDURAL at 15:52:00

## 2017-11-01 NOTE — PROGRESS NOTES
11/1/2017, 6:15 PM    Called to bedside at 1748 for fetal decels. Difficulty tracing baby however noted to have recurrent decels with recovery to 110s. Pit off, IVF bolus running, oxygen administred, terbutaline given.   Patient positioned on hands and kn

## 2017-11-01 NOTE — ANESTHESIA PROCEDURE NOTES
Labor Analgesia  Performed by: Archana Fails by: Obed Rao     Patient Location:  OB  Start Time:  11/1/2017 3:48 PM  End Time:  11/1/2017 4:00 PM  Reason for Block: labor epidural    Anesthesiologist:  Obed Rao  Performed by:  Danis Teran

## 2017-11-01 NOTE — H&P
[address] Patient Status:  Inpatient    1980 MRN [id number]   Location [address] [place] [address] Attending Adore Matthews MD   Hosp Day # 0 PCP Onnie Eisenmenger, MD     Date of Admission: • Abnormal Pap smear of cervix     in 2011   • Decorative tattoo    • Tattoo     on leg   • Varicella     in childhood      Past Social History: Past Surgical History:  No date: D & C      Comment: Dec 2016  Family History:   Family History   Problem Rel times daily. Use as directed. Disp: 1 Box Rfl: 3 Taking   Loratadine (CLARITIN) 10 MG Oral Cap Take by mouth. Disp:  Rfl:  Taking   prenatal multivitamin plus DHA 27-0.8-228 MG Oral Cap Take 1 capsule by mouth daily.  Disp:  Rfl:  Taking       Allergies:

## 2017-11-01 NOTE — PROGRESS NOTES
Pt having prolong,variable decels, Changing positions, Increase IV fluid, Pitocin off, O2 mask on, Dr Lalito Antunez at bed side, scalp lead applied, Jeramy, scrub, and SCN notified of possible C/S.

## 2017-11-01 NOTE — PROGRESS NOTES
11/1/2017, 5:38 PM    Subjective:    Patient is       Objective:   11/01/17  1700 11/01/17  1703 11/01/17  1705 11/01/17  1711   BP: (!) 82/62 106/67 111/61 111/61   Pulse: 78 82 78 78   Resp:       Temp:       SpO2:       Weight:       Height:           I

## 2017-11-01 NOTE — ANESTHESIA PREPROCEDURE EVALUATION
Anesthesia PreOp Note    HPI:     Mc Gamino is a 30year old female who presents for preoperative consultation requested by: * No surgeons listed *    Date of Surgery: 11/1/2017    * No procedures listed *  Indication: * No pre-op diagnosis entered * Taking   JAMILAH MICROLET LANCETS Does not apply Misc 1 lancet by Finger stick route 4 (four) times daily. Use as directed. Disp: 1 Box Rfl: 3 Taking   Loratadine (CLARITIN) 10 MG Oral Cap Take by mouth.  Disp:  Rfl:  Taking   prenatal multivitamin plus DHA 2 HCl (PF) (MARCAINE) 0.25 % injection         fentaNYL 2mcg/ml & bupivacaine 1.25mg/ml 2 mcg/ml-0.125% epidural infusion         lactated ringers infusion  Intravenous Continuous Moise Robins MD Last Rate: 1,000 mL/hr at 11/01/17 1600 1,000 mL at 11/01/17 16 (36.7 °C). Her blood pressure is 103/56 and her pulse is 66. Her respiration is 17 and oxygen saturation is 100%.     11/01/17  1615 11/01/17  1618 11/01/17  1621 11/01/17  1624   BP: 102/70 112/73 107/61 103/56   Pulse: 72 74 72 66   Resp:    17   Temp:

## 2017-11-01 NOTE — PROGRESS NOTES
11/1/2017, 2:17 PM    Subjective:  Patient is feeling mild contractions    Objective:   11/01/17  1330 11/01/17  1340 11/01/17  1345 11/01/17  1400   BP: (!) 69/38 97/59 97/54 106/85   Pulse: 117 66 68 76   Resp:  18     Temp:  98.3 °F (36.8 °C)     Weight

## 2017-11-02 PROCEDURE — 0DQR0ZZ REPAIR ANAL SPHINCTER, OPEN APPROACH: ICD-10-PCS | Performed by: OBSTETRICS & GYNECOLOGY

## 2017-11-02 PROCEDURE — 59400 OBSTETRICAL CARE: CPT | Performed by: OBSTETRICS & GYNECOLOGY

## 2017-11-02 RX ORDER — SODIUM CHLORIDE 0.9 % (FLUSH) 0.9 %
10 SYRINGE (ML) INJECTION AS NEEDED
Status: DISCONTINUED | OUTPATIENT
Start: 2017-11-02 | End: 2017-11-04

## 2017-11-02 RX ORDER — DIAPER,BRIEF,INFANT-TODD,DISP
1 EACH MISCELLANEOUS EVERY 6 HOURS PRN
Status: DISCONTINUED | OUTPATIENT
Start: 2017-11-02 | End: 2017-11-04

## 2017-11-02 RX ORDER — ONDANSETRON 2 MG/ML
4 INJECTION INTRAMUSCULAR; INTRAVENOUS EVERY 6 HOURS PRN
Status: DISCONTINUED | OUTPATIENT
Start: 2017-11-02 | End: 2017-11-04

## 2017-11-02 RX ORDER — PRENATAL VIT,CAL 76/IRON/FOLIC 29 MG-1 MG
1 TABLET ORAL DAILY
Status: DISCONTINUED | OUTPATIENT
Start: 2017-11-02 | End: 2017-11-04

## 2017-11-02 RX ORDER — IBUPROFEN 600 MG/1
600 TABLET ORAL EVERY 4 HOURS PRN
Status: DISCONTINUED | OUTPATIENT
Start: 2017-11-02 | End: 2017-11-04

## 2017-11-02 RX ORDER — AMMONIA INHALANTS 0.04 G/.3ML
0.3 INHALANT RESPIRATORY (INHALATION) AS NEEDED
Status: DISCONTINUED | OUTPATIENT
Start: 2017-11-02 | End: 2017-11-04

## 2017-11-02 RX ORDER — LIDOCAINE HYDROCHLORIDE 10 MG/ML
INJECTION, SOLUTION EPIDURAL; INFILTRATION; INTRACAUDAL; PERINEURAL
Status: DISPENSED
Start: 2017-11-02 | End: 2017-11-02

## 2017-11-02 RX ORDER — BISACODYL 10 MG
10 SUPPOSITORY, RECTAL RECTAL ONCE AS NEEDED
Status: DISCONTINUED | OUTPATIENT
Start: 2017-11-02 | End: 2017-11-04

## 2017-11-02 RX ORDER — IBUPROFEN 400 MG/1
400 TABLET ORAL EVERY 4 HOURS PRN
Status: DISCONTINUED | OUTPATIENT
Start: 2017-11-02 | End: 2017-11-04

## 2017-11-02 RX ORDER — SIMETHICONE 80 MG
80 TABLET,CHEWABLE ORAL 3 TIMES DAILY PRN
Status: DISCONTINUED | OUTPATIENT
Start: 2017-11-02 | End: 2017-11-04

## 2017-11-02 RX ORDER — DOCUSATE SODIUM 100 MG/1
100 CAPSULE, LIQUID FILLED ORAL 2 TIMES DAILY
Status: DISCONTINUED | OUTPATIENT
Start: 2017-11-02 | End: 2017-11-04

## 2017-11-02 RX ORDER — ACETAMINOPHEN 325 MG/1
650 TABLET ORAL EVERY 6 HOURS PRN
Status: DISCONTINUED | OUTPATIENT
Start: 2017-11-02 | End: 2017-11-04

## 2017-11-02 RX ORDER — IBUPROFEN 200 MG
200 TABLET ORAL EVERY 4 HOURS PRN
Status: DISCONTINUED | OUTPATIENT
Start: 2017-11-02 | End: 2017-11-04

## 2017-11-02 NOTE — LACTATION NOTE
This note was copied from a baby's chart.   LACTATION NOTE - INFANT    Evaluation Type  Evaluation Type: Inpatient    Problems & Assessment  Problems Diagnosed or Identified: Sleepy  Muscle tone: Appropriate for GA    Feeding Assessment  Summary Current Fee

## 2017-11-02 NOTE — PROGRESS NOTES
11/1/2017, 7:50 PM    Subjective:  Patient is resting comfortably  IUPC inserted w/o difficulty    Objective:   11/01/17  1834 11/01/17  1845 11/01/17  1900 11/01/17  1930   BP: 109/56 108/66 112/71 105/56   BP Location:    Right arm   Pulse: 100 103 89 10

## 2017-11-02 NOTE — L&D DELIVERY NOTE
[facility] HOSP - [facility]    Vaginal Delivery Note    Primo Bach Patient Status:  Inpatient    1980 MRN [id number]   Location [unfilled] Attending Elvira Villarreal MD   Hosp Day # 1 PCP Emely Fowler MD     Delivery     Infant  Date of Delivery:  Sponge and needle counts verified.         Mindi Tucker DO   11/2/2017  1:24 AM

## 2017-11-02 NOTE — PROGRESS NOTES
11/1/2017, 11:18 PM    Subjective:  Patient is feeling rectal pressure    Objective:   11/01/17 2215 11/01/17 2230 11/01/17 2245 11/01/17  2300   BP: 124/71 115/78 117/75 138/78   BP Location:  Right arm     Pulse: 82 87 81 94   Resp:  16     Temp:  98.

## 2017-11-02 NOTE — PROGRESS NOTES
Dr Wilfrido Wayne discussing possible use of vacuum with pt and spouse in regards to decels with pushing. Educating pt on risks and benefits of vacuum, and procedure. Pt states understanding. No questions at this time. Will ctm.  Dr Wilrfido Wayne remains at bs for

## 2017-11-02 NOTE — PROGRESS NOTES
Received patient into room 349  Bedside shift report received from [facility], located within [facility].  Patient transferred to bed from [facility] [place] Bed in locked and low position.  Side rails up X2.  Vital signs stable, fundus FIRM at U/U, lochia SMALL, 0 clots.  IV site LW

## 2017-11-02 NOTE — DISCHARGE SUMMARY
[facility] - [facility]    Discharge Summary    Primo Bach Patient Status:  Inpatient    1980 MRN [id number]   Location [address]  Attending Elvira Villarreal MD   [facility] Day # 1       Delivering OB Clinician: Dr. Segundo Johnson

## 2017-11-02 NOTE — ANESTHESIA POSTPROCEDURE EVALUATION
Patient: Reilly Jones    Procedure Summary     Date:  11/01/17 Room / Location:      Anesthesia Start:  2092 Anesthesia Stop:      Procedure:  LABOR ANALGESIA Diagnosis:      Scheduled Providers:   Anesthesiologist:  Margarita Connell MD    Anesthesia Type:

## 2017-11-02 NOTE — LACTATION NOTE
LACTATION NOTE - MOTHER      Evaluation Type: Inpatient    Problems identified  Problems identified: Knowledge deficit    Maternal history  Maternal history: AMA  Other/comment: grp B psoitive    Breastfeeding goal  Breastfeeding goal: To maintain breast m

## 2017-11-02 NOTE — PLAN OF CARE
Got patient up to bathroom to void. Pt was unable to void, began to feel nauseous and dizzy. helped patient back to bed where she continuted to feel nauseous.     Called MD - received order to straight Cath - keep fluids running at 100ml/hr and have her retr

## 2017-11-03 NOTE — LACTATION NOTE
This note was copied from a baby's chart.   LACTATION NOTE - INFANT    Evaluation Type  Evaluation Type: Inpatient    Problems & Assessment  Infant Assessment: Skin color: pink or appropriate for ethnicity;Good skin turgor;Oral mucous membranes moist;Minima

## 2017-11-03 NOTE — LACTATION NOTE
LACTATION NOTE - MOTHER      Evaluation Type: Inpatient    Problems identified  Problems identified: Knowledge deficit    Maternal history  Maternal history: AMA  Other/comment: grp B positive    Breastfeeding goal  Breastfeeding goal: To maintain breast m

## 2017-11-03 NOTE — PROGRESS NOTES
Post-Partum Note   11/3/2017, 12:04 PM  Late entry from 1020    Subjective:  Patient doing well. Pain mild. Lochia is small. She reports she does tolerate a regular diet. She denies headache, fever, chest pain, shortness of breath, and leg pain.   She ha

## 2017-11-04 VITALS
WEIGHT: 198 LBS | RESPIRATION RATE: 16 BRPM | HEIGHT: 65 IN | OXYGEN SATURATION: 92 % | BODY MASS INDEX: 32.99 KG/M2 | SYSTOLIC BLOOD PRESSURE: 115 MMHG | DIASTOLIC BLOOD PRESSURE: 69 MMHG | TEMPERATURE: 98 F | HEART RATE: 78 BPM

## 2017-11-16 ENCOUNTER — POSTPARTUM (OUTPATIENT)
Dept: OBGYN CLINIC | Facility: CLINIC | Age: 37
End: 2017-11-16

## 2017-11-16 VITALS
HEART RATE: 99 BPM | DIASTOLIC BLOOD PRESSURE: 84 MMHG | BODY MASS INDEX: 31 KG/M2 | WEIGHT: 185 LBS | SYSTOLIC BLOOD PRESSURE: 128 MMHG

## 2017-11-16 NOTE — H&P
RITU Rivera is a 30year old female  here for 2 week ppv. S/P  on 17 with partial third degree perineal laceration. No complaints today. Moving bowels and bladder w/o issues. Taking stool softner daily.   No constipation/diarrhea/ supply. Taking fenugreek. Continue stool softner BID. Inc hydration.

## 2017-11-24 ENCOUNTER — TELEPHONE (OUTPATIENT)
Dept: LACTATION | Facility: HOSPITAL | Age: 37
End: 2017-11-24

## 2017-11-24 NOTE — TELEPHONE ENCOUNTER
Patient called back, she states her milk supply is low, collects about 2oz per pumping session, infant is bottle feeding only at this time, no longer latching to breast, mom is taking fenugreek supplements, advised to increase pumping session duration and

## 2017-12-13 ENCOUNTER — TELEPHONE (OUTPATIENT)
Dept: PEDIATRICS CLINIC | Facility: CLINIC | Age: 37
End: 2017-12-13

## 2017-12-13 ENCOUNTER — POSTPARTUM (OUTPATIENT)
Dept: OBGYN CLINIC | Facility: CLINIC | Age: 37
End: 2017-12-13

## 2017-12-13 VITALS
WEIGHT: 188 LBS | SYSTOLIC BLOOD PRESSURE: 132 MMHG | DIASTOLIC BLOOD PRESSURE: 81 MMHG | HEART RATE: 97 BPM | BODY MASS INDEX: 31 KG/M2

## 2017-12-13 DIAGNOSIS — Z86.32 H/O GESTATIONAL DIABETES MELLITUS, NOT CURRENTLY PREGNANT: ICD-10-CM

## 2017-12-13 PROBLEM — O24.419 GESTATIONAL DIABETES: Status: RESOLVED | Noted: 2017-08-23 | Resolved: 2017-12-13

## 2017-12-13 PROBLEM — B95.1 POSITIVE GBS TEST: Status: RESOLVED | Noted: 2017-10-21 | Resolved: 2017-12-13

## 2017-12-13 PROBLEM — O09.519 AMA (ADVANCED MATERNAL AGE) PRIMIGRAVIDA 35+: Status: RESOLVED | Noted: 2017-04-10 | Resolved: 2017-12-13

## 2017-12-13 RX ORDER — DOCUSATE SODIUM 100 MG/1
100 CAPSULE, LIQUID FILLED ORAL 2 TIMES DAILY
COMMUNITY
End: 2018-01-17

## 2017-12-13 NOTE — TELEPHONE ENCOUNTER
Akash Li from registration calling to report that pt called to schedule her glucose test and there is no order in the system. Pt was seen by [address] [place] [address] today for PP visit. Message to BO---ok to place order for 2 hr gtt?

## 2017-12-13 NOTE — TELEPHONE ENCOUNTER
Pt informed of MAZs recs and verbalized understanding. Order for 2 hr gtt placed. Pt also stated that she sent [employer] a [employer] 3[employer] message stating which OCPs she has used in the past. Pt stated it was Junel Fe 1.5/30. Message to [address]

## 2017-12-16 ENCOUNTER — LAB ENCOUNTER (OUTPATIENT)
Dept: LAB | Facility: HOSPITAL | Age: 37
End: 2017-12-16
Attending: OBSTETRICS & GYNECOLOGY
Payer: COMMERCIAL

## 2017-12-16 PROCEDURE — 36415 COLL VENOUS BLD VENIPUNCTURE: CPT

## 2017-12-16 PROCEDURE — 82951 GLUCOSE TOLERANCE TEST (GTT): CPT

## 2017-12-19 ENCOUNTER — TELEPHONE (OUTPATIENT)
Dept: OBGYN CLINIC | Facility: CLINIC | Age: 37
End: 2017-12-19

## 2017-12-19 ENCOUNTER — PATIENT MESSAGE (OUTPATIENT)
Dept: OBGYN CLINIC | Facility: CLINIC | Age: 37
End: 2017-12-19

## 2017-12-19 NOTE — TELEPHONE ENCOUNTER
----- Message from Love Nobles sent at 12/19/2017  7:55 AM CST -----  Regarding: Prescription Question  Contact: [phone]  since my test results came back, I'm i able to get the prescription to the birth control pills I sent the name for last week? you had said you would give me a prescription for 4 months worth. I'm just asking since my period is about to end.

## 2017-12-20 ENCOUNTER — LAB ENCOUNTER (OUTPATIENT)
Dept: LAB | Age: 37
End: 2017-12-20
Attending: INTERNAL MEDICINE
Payer: COMMERCIAL

## 2017-12-20 ENCOUNTER — OFFICE VISIT (OUTPATIENT)
Dept: INTERNAL MEDICINE CLINIC | Facility: CLINIC | Age: 37
End: 2017-12-20

## 2017-12-20 ENCOUNTER — PATIENT MESSAGE (OUTPATIENT)
Dept: OBGYN CLINIC | Facility: CLINIC | Age: 37
End: 2017-12-20

## 2017-12-20 VITALS
WEIGHT: 192 LBS | HEART RATE: 97 BPM | SYSTOLIC BLOOD PRESSURE: 112 MMHG | HEIGHT: 65 IN | BODY MASS INDEX: 31.99 KG/M2 | DIASTOLIC BLOOD PRESSURE: 81 MMHG

## 2017-12-20 DIAGNOSIS — M79.643 INTERMITTENT PAIN AND SWELLING OF HAND: ICD-10-CM

## 2017-12-20 DIAGNOSIS — R73.02 IMPAIRED GLUCOSE TOLERANCE: Primary | ICD-10-CM

## 2017-12-20 DIAGNOSIS — M79.89 INTERMITTENT PAIN AND SWELLING OF HAND: ICD-10-CM

## 2017-12-20 DIAGNOSIS — R73.02 IMPAIRED GLUCOSE TOLERANCE: ICD-10-CM

## 2017-12-20 PROCEDURE — 80053 COMPREHEN METABOLIC PANEL: CPT

## 2017-12-20 PROCEDURE — 85025 COMPLETE CBC W/AUTO DIFF WBC: CPT

## 2017-12-20 PROCEDURE — 84443 ASSAY THYROID STIM HORMONE: CPT

## 2017-12-20 PROCEDURE — 36415 COLL VENOUS BLD VENIPUNCTURE: CPT

## 2017-12-20 PROCEDURE — 83036 HEMOGLOBIN GLYCOSYLATED A1C: CPT

## 2017-12-20 PROCEDURE — 99212 OFFICE O/P EST SF 10 MIN: CPT | Performed by: INTERNAL MEDICINE

## 2017-12-20 PROCEDURE — 99213 OFFICE O/P EST LOW 20 MIN: CPT | Performed by: INTERNAL MEDICINE

## 2017-12-20 RX ORDER — ACETAMINOPHEN AND CODEINE PHOSPHATE 120; 12 MG/5ML; MG/5ML
0.35 SOLUTION ORAL DAILY
Qty: 28 TABLET | Refills: 5 | Status: SHIPPED | OUTPATIENT
Start: 2017-12-20 | End: 2018-01-17

## 2017-12-20 NOTE — TELEPHONE ENCOUNTER
From: Yulia Choudhary  To: Robby Fernando DO  Sent: 12/19/2017 10:21 PM CST  Subject: Prescription Question    Will you be prescribing the birth control pills? I just finished my period today and would like to start taking them tomorrow.

## 2017-12-20 NOTE — TELEPHONE ENCOUNTER
The A1c may not be completely accurate as she had GDM during this pregnancy and doesn't show 3 months of non pregnant values since she delivered in November.   Depending on what the results show, I'll have a better idea of the situation regarding estrogen b

## 2017-12-20 NOTE — PROGRESS NOTES
Reilly Jones is a 30year old female.   Patient presents with:  Test Results: discuss glucose test from OB had a baby 7 weeks ago      HPI:    pt comes for f/u  C/c impaired glucose tolerance   C/o had gestational diabetes during preg and pt does states that 112/81 (BP Location: Right arm, Patient Position: Sitting, Cuff Size: large)   Pulse 97   Ht 5' 5\" (1.651 m)   Wt 192 lb (87.1 kg)   LMP 12/06/2017   BMI 31.95 kg/m²   GENERAL: well developed, well nourished,in no apparent distress  SKIN: no rashes,no collins

## 2017-12-20 NOTE — TELEPHONE ENCOUNTER
From: [place]  To: Ana Bloom DO  Sent: 12/20/2017 2:54 PM CST  Subject: Test Results Question    I know my 2hr was a little high now my general doctor is doing blood work to male sure I'm not diabetic. so told her the night before I had a few

## 2017-12-20 NOTE — PATIENT INSTRUCTIONS
After a Vaginal Birth  After having a baby, your body may be very tired. It can take time to recover from a vaginal delivery. You may stay in the hospital or birth center from 1 to 4 days. In some cases, you may be able to go home the same day.     Right If you have stitches  You may have received stitches in the skin near your vagina. The stitches might have closed an episiotomy (an incision that enlarges the opening of the vagina). Or you may have needed stitches to repair torn skin.  Either way, your sti · Feelings of extreme sadness or anxiety, or a feeling that you don’t want to be with your baby  · Abdominal pain that isn’t relieved with medicine  · Vaginal discharge that has a bad odor  · No bowel movement for 5 days  · Painful urination, orinability t

## 2017-12-21 NOTE — TELEPHONE ENCOUNTER
Looks like Dr. Sania Chapman has reviewed the labs and informed pt of recs (see result notes). Any additional recs from you? Routed to [address] to please review.

## 2017-12-22 NOTE — H&P
RITU Potter is a 30year old female  here for 6 week post-partum visit. Patient delivered a  male infant on 17 via . Had partial 3rd degree perineal lac. Patient desires OCPs for contraception. Patient is formula feeding.    Patient QID UTD, Disp: , Rfl: 3  •  prenatal multivitamin plus DHA 27-0.8-228 MG Oral Cap, Take 1 capsule by mouth daily. , Disp: , Rfl:     ALLERGIES:    Seasonal                    PHYSICAL EXAM  Blood pressure 132/81, pulse 97, weight 188 lb (85.3 kg), last mens

## 2017-12-22 NOTE — TELEPHONE ENCOUNTER
Id advise she use the Progestitn only pills as the levels also reflect when she was controlled on insulin. We can recheck an a1c in 3 mo from now, as this will show 3 mo of non pregnant Blood sugar values.   If her A1C is still okay, then we can switch ove

## 2018-01-04 DIAGNOSIS — O24.410 DIET CONTROLLED GESTATIONAL DIABETES MELLITUS (GDM) IN SECOND TRIMESTER: ICD-10-CM

## 2018-01-17 ENCOUNTER — OFFICE VISIT (OUTPATIENT)
Dept: OBGYN CLINIC | Facility: CLINIC | Age: 38
End: 2018-01-17

## 2018-01-17 VITALS
HEART RATE: 105 BPM | WEIGHT: 191.63 LBS | DIASTOLIC BLOOD PRESSURE: 78 MMHG | BODY MASS INDEX: 32 KG/M2 | SYSTOLIC BLOOD PRESSURE: 120 MMHG

## 2018-01-17 DIAGNOSIS — N89.8 VAGINAL IRRITATION: Primary | ICD-10-CM

## 2018-01-17 PROCEDURE — 99213 OFFICE O/P EST LOW 20 MIN: CPT | Performed by: CLINICAL NURSE SPECIALIST

## 2018-01-17 RX ORDER — NYSTATIN AND TRIAMCINOLONE ACETONIDE 100000; 1 [USP'U]/G; MG/G
1 OINTMENT TOPICAL 2 TIMES DAILY
Qty: 30 G | Refills: 0 | Status: SHIPPED | OUTPATIENT
Start: 2018-01-17 | End: 2018-01-24

## 2018-01-17 RX ORDER — FLUCONAZOLE 150 MG/1
150 TABLET ORAL ONCE
Qty: 2 TABLET | Refills: 0 | Status: SHIPPED | OUTPATIENT
Start: 2018-01-17 | End: 2018-01-17

## 2018-01-17 NOTE — PROGRESS NOTES
Mc Gamino is a 30year old female [id number] Patient's last menstrual period was 01/03/2018. Patient presents with:  Gyn Problem: burning, discharge, redness  Has noticed itching, burning and clumpy discharge since Monday.  Denies any odor or pain with urina Constitutional: well developed, well nourished  Head/Face: normocephalic  Psychiatric:  Oriented to time, place, person and situation.  Appropriate mood and affect    Pelvic Exam:  External Genitalia:+ erythema, fissure noted   Urethral Meatus:  normal in

## 2018-01-19 LAB
CANDIDA SCREEN: NEGATIVE
GENITAL VAGINOSIS SCREEN: NEGATIVE
TRICHOMONAS SCREEN: NEGATIVE

## 2018-01-30 ENCOUNTER — TELEPHONE (OUTPATIENT)
Dept: OBGYN CLINIC | Facility: CLINIC | Age: 38
End: 2018-01-30

## 2018-02-21 ENCOUNTER — APPOINTMENT (OUTPATIENT)
Dept: LAB | Facility: HOSPITAL | Age: 38
End: 2018-02-21
Attending: OBSTETRICS & GYNECOLOGY
Payer: COMMERCIAL

## 2018-02-21 PROCEDURE — 36415 COLL VENOUS BLD VENIPUNCTURE: CPT

## 2018-02-21 PROCEDURE — 83036 HEMOGLOBIN GLYCOSYLATED A1C: CPT

## 2018-02-22 LAB — HBA1C MFR BLD: 5.8 % (ref 4–6)

## 2018-02-27 ENCOUNTER — PATIENT MESSAGE (OUTPATIENT)
Dept: OBGYN CLINIC | Facility: CLINIC | Age: 38
End: 2018-02-27

## 2018-02-27 NOTE — TELEPHONE ENCOUNTER
From: Mc Gamino  To: Analy Leo DO  Sent: 2/27/2018 5:50 PM CST  Subject: Prescription Question    has the doctor sent my prescription for the new birth control pills? I haven't head back from anyone in a few days.  I haven't started the new pack

## 2018-02-28 RX ORDER — NORETHINDRONE ACETATE AND ETHINYL ESTRADIOL .03; 1.5 MG/1; MG/1
1 TABLET ORAL DAILY
Qty: 3 PACKAGE | Refills: 0 | Status: SHIPPED | OUTPATIENT
Start: 2018-02-28 | End: 2018-03-28

## 2018-02-28 NOTE — TELEPHONE ENCOUNTER
BO pt requested rx for new OCP. Are you switching her OCP? Please provide order. Also, pt does not have annual exams with group. She had Papsmear done in April 2017 during initial PN appt. PP appt was in Dec 2017. Is her next annual due in April 2018?

## 2018-02-28 NOTE — TELEPHONE ENCOUNTER
Please send Delaney Ring 1.5/30 to pharmacy and let patient know she needs to follow up in April for annual exam.  Alex Rose her for 3 months.

## 2018-03-21 ENCOUNTER — PATIENT MESSAGE (OUTPATIENT)
Dept: INTERNAL MEDICINE CLINIC | Facility: CLINIC | Age: 38
End: 2018-03-21

## 2018-03-21 ENCOUNTER — NURSE TRIAGE (OUTPATIENT)
Dept: OTHER | Age: 38
End: 2018-03-21

## 2018-03-21 ENCOUNTER — IMAGING SERVICES (OUTPATIENT)
Dept: OTHER | Age: 38
End: 2018-03-21

## 2018-03-21 ENCOUNTER — CHARTING TRANS (OUTPATIENT)
Dept: OTHER | Age: 38
End: 2018-03-21

## 2018-03-21 NOTE — TELEPHONE ENCOUNTER
From: Caty Zavala  To: Lakisha Stoll MD  Sent: 3/21/2018 8:17 AM CDT  Subject: Prescription Question    Good Morning,       I've been feeling sick throat hurts and hard to swallow, is there any antibiotics you can prescribe without me going into the see y

## 2018-03-21 NOTE — TELEPHONE ENCOUNTER
----- Message from Larry Burr sent at 3/21/2018  8:17 AM CDT -----  Regarding: Prescription Question  Contact: [phone]  Good Morning,        I've been feeling sick throat hurts and hard to swallow, is there any antibiotics you can prescribe without m

## 2018-03-21 NOTE — TELEPHONE ENCOUNTER
Action Requested: Summary for Provider     []  Critical Lab, Recommendations Needed  [x] Need Additional Advice  []   FYI    []   Need Orders  [x] Need Medications Sent to Pharmacy  []  Other     SUMMARY:MD advice/possible Rx or need for [facility] or  visit?  P

## 2018-03-21 NOTE — TELEPHONE ENCOUNTER
Agree with advice given, patient should be evaluated prior to antibiotics-if she cannot come in during the day due to work then she can go to immediate care or urgent care.   In addition please do advise her for frequent handwashing's, and rest as possible

## 2018-03-22 NOTE — TELEPHONE ENCOUNTER
Pt contacted. States she went to a [facility] today, and states was diagnosed with strep. Pt was sent home with penicillin antibiotics.

## 2018-03-28 ENCOUNTER — PATIENT MESSAGE (OUTPATIENT)
Dept: INTERNAL MEDICINE CLINIC | Facility: CLINIC | Age: 38
End: 2018-03-28

## 2018-03-28 NOTE — TELEPHONE ENCOUNTER
Pt states went to [facility] last Wednesday and dx with strep throat. Pt was given 10 day course of PCN and still has sore throat and ear pain. Pt would like to schedule OV with PCP this Friday due to work schedule.    No available appointments with Dr Cee Arredondo,

## 2018-03-28 NOTE — TELEPHONE ENCOUNTER
From: Tamie Tirado  To: Kyra Anderson MD  Sent: 3/28/2018 10:55 AM CDT  Subject: Other    I'm trying to schedule an appt with the doctor but says no available appointments for anyone in the group, are they not in the office this Friday the 30th or Saturday

## 2018-03-30 ENCOUNTER — OFFICE VISIT (OUTPATIENT)
Dept: INTERNAL MEDICINE CLINIC | Facility: CLINIC | Age: 38
End: 2018-03-30

## 2018-03-30 VITALS
TEMPERATURE: 98 F | WEIGHT: 193 LBS | DIASTOLIC BLOOD PRESSURE: 82 MMHG | HEIGHT: 65 IN | SYSTOLIC BLOOD PRESSURE: 129 MMHG | BODY MASS INDEX: 32.15 KG/M2 | HEART RATE: 90 BPM

## 2018-03-30 DIAGNOSIS — J02.0 STREP PHARYNGITIS: Primary | ICD-10-CM

## 2018-03-30 PROCEDURE — 99212 OFFICE O/P EST SF 10 MIN: CPT | Performed by: INTERNAL MEDICINE

## 2018-03-30 PROCEDURE — 99213 OFFICE O/P EST LOW 20 MIN: CPT | Performed by: INTERNAL MEDICINE

## 2018-03-30 RX ORDER — PENICILLIN V POTASSIUM 500 MG/1
TABLET ORAL
Refills: 0 | COMMUNITY
Start: 2018-03-21 | End: 2018-08-15

## 2018-03-30 NOTE — PROGRESS NOTES
Yessica Hoffman is a 41year old female. Patient presents with:  Urgent Care F/u: Was seen in urgent care on wednesday 3/21 and was dx with strep    HPI:   Mera Hernández presents this morning for Immediate Care follow-up.     She went to an outside Immediate Care facil oropharynx normal without erythema ulceration swelling or exudate  NECK: Supple without mass or lymphadenopathy  LUNGS: Resonant to percussion and clear to auscultation without crackles or wheezes    ASSESSMENT AND PLAN:   1.  Strep pharyngitis  Resolving o

## 2018-05-10 ENCOUNTER — OFFICE VISIT (OUTPATIENT)
Dept: OBGYN CLINIC | Facility: CLINIC | Age: 38
End: 2018-05-10

## 2018-05-10 VITALS
BODY MASS INDEX: 31 KG/M2 | DIASTOLIC BLOOD PRESSURE: 79 MMHG | SYSTOLIC BLOOD PRESSURE: 129 MMHG | WEIGHT: 189 LBS | HEART RATE: 78 BPM

## 2018-05-10 DIAGNOSIS — Z01.419 ENCOUNTER FOR GYNECOLOGICAL EXAMINATION WITHOUT ABNORMAL FINDING: Primary | ICD-10-CM

## 2018-05-10 DIAGNOSIS — Z76.0 MEDICATION REFILL: ICD-10-CM

## 2018-05-10 PROCEDURE — 99395 PREV VISIT EST AGE 18-39: CPT | Performed by: OBSTETRICS & GYNECOLOGY

## 2018-05-10 RX ORDER — NORETHINDRONE ACETATE AND ETHINYL ESTRADIOL .03; 1.5 MG/1; MG/1
1 TABLET ORAL DAILY
Qty: 3 PACKAGE | Refills: 3 | Status: SHIPPED | OUTPATIENT
Start: 2018-05-10 | End: 2018-06-07

## 2018-05-12 NOTE — H&P
HPI:  The patient is a 72-year-old female who presents for well woman examination today. Without any GYN complaints. LMP 4/15/2018. Fleet menses on OCPs. Sexually active with .     LPS: 4/10/2017 ascus HPV negative Pap      Reviewed medical and chills   Cardiovascular:  denies chest pain or palpitations  Respiratory:  denies shortness of breath  Gastrointestinal:  denies heartburn, abdominal pain, diarrhea or constipation  Genitourinary:  denies dysuria, incontinence, abnormal vaginal discharge, guidelines with the patient -- pap utd  2. Contraception: Using OCPs w/o issues  3. Breast Health:     1. Mammo @ 39 yo  2. Reviewed monthly self breast exams with the patient   4.  Discussed diet, exercise, VitD/Ca for Bone Health, Limiting ETOH, and healt

## 2018-05-24 ENCOUNTER — PATIENT MESSAGE (OUTPATIENT)
Dept: INTERNAL MEDICINE CLINIC | Facility: CLINIC | Age: 38
End: 2018-05-24

## 2018-05-24 RX ORDER — OLOPATADINE HYDROCHLORIDE 1 MG/ML
1 SOLUTION/ DROPS OPHTHALMIC 2 TIMES DAILY
Qty: 5 ML | Refills: 0 | Status: SHIPPED | OUTPATIENT
Start: 2018-05-24 | End: 2019-04-20

## 2018-05-24 NOTE — TELEPHONE ENCOUNTER
From: Margaret Potter  To: Sammi Landeros MD  Sent: 5/24/2018 9:24 AM CDT  Subject: Prescription Question    I was wondering if I could get a prescription for patanol, I've used it in the past for my allergies and I would like to use it again.  Thank you

## 2018-05-28 ENCOUNTER — PATIENT MESSAGE (OUTPATIENT)
Dept: OBGYN CLINIC | Facility: CLINIC | Age: 38
End: 2018-05-28

## 2018-05-29 RX ORDER — FLUCONAZOLE 150 MG/1
150 TABLET ORAL ONCE
Qty: 1 TABLET | Refills: 0 | Status: SHIPPED | OUTPATIENT
Start: 2018-05-29 | End: 2018-05-29

## 2018-05-29 NOTE — TELEPHONE ENCOUNTER
Pt requesting Diflucan rx. Routed to [facility] to please advise. She was seen in January 2018 for vaginal burning, itching, and redness. Thank you.

## 2018-05-29 NOTE — TELEPHONE ENCOUNTER
One dose of Diflucan sent. If no relief after 72 hrs, she can either try Monistat or come in to be seen.     MAF

## 2018-05-29 NOTE — TELEPHONE ENCOUNTER
From: Mc Gamino  To: HARDY Emanuel  Sent: 5/28/2018 7:43 PM CDT  Subject: Prescription Question    I have a yeast infection, I know it because I've had this 3 times already. I'm itchy and I have alot of yeast in my vagina.  Would you be able to pres

## 2018-05-30 ENCOUNTER — PATIENT MESSAGE (OUTPATIENT)
Dept: INTERNAL MEDICINE CLINIC | Facility: CLINIC | Age: 38
End: 2018-05-30

## 2018-05-30 NOTE — TELEPHONE ENCOUNTER
Informed pt that MAF sent in one  dose of Diflucan that was sent. Informed pt If no relief after 72 hrs, she can either try Monistat or come in to be seen.   Pt stated understanding.

## 2018-05-31 NOTE — TELEPHONE ENCOUNTER
From: Natalie Inman  To: Angelica Martinez MD  Sent: 5/30/2018 7:15 AM CDT  Subject: Prescription Question    I had sent DR. Jose Lyle a message last Thursday about prescribing eye drops for my allergies and I still haven't heard back. Is she in this week?

## 2018-07-10 ENCOUNTER — NURSE TRIAGE (OUTPATIENT)
Dept: OTHER | Age: 38
End: 2018-07-10

## 2018-07-10 ENCOUNTER — PATIENT MESSAGE (OUTPATIENT)
Dept: INTERNAL MEDICINE CLINIC | Facility: CLINIC | Age: 38
End: 2018-07-10

## 2018-07-10 NOTE — TELEPHONE ENCOUNTER
Spoke with pt and pt unable to come into during weekday due to schedule at work. Pt agrees to schedule Saturday 7/14/18 with Dr Raymond Mosley.     Pt instructed to go to ER if symptoms worsen.   Pt agrees to plan

## 2018-07-10 NOTE — TELEPHONE ENCOUNTER
Patient should be evaluated prior 2 medications–as she had this before? Has anything been prescribed before our anything else works?   Continue with calamine lotion, sometimes cortisone helps, Benadryl at night helps with the itching–avoid scratching  If s

## 2018-07-10 NOTE — TELEPHONE ENCOUNTER
From: Avery Mullins  To: Alexys Yeh MD  Sent: 7/10/2018 10:18 AM CDT  Subject: Prescription Question    I ended up getting a sun rash on my chest and on one of my hands.  Is there anything you can prescribe that will help get rid of it faster than just wa

## 2018-07-10 NOTE — TELEPHONE ENCOUNTER
Please reply to pool: EM RN TRIAGE  Action Requested: Summary for Provider     []  Critical Lab, Recommendations Needed  [x] Need Additional Advice  []   FYI    [x]   Need Orders  [] Need Medications Sent to Pharmacy  []  Other     SUMMARY: declines OV,

## 2018-08-04 ENCOUNTER — PATIENT MESSAGE (OUTPATIENT)
Dept: OBGYN CLINIC | Facility: CLINIC | Age: 38
End: 2018-08-04

## 2018-08-15 ENCOUNTER — TELEPHONE (OUTPATIENT)
Dept: OBGYN CLINIC | Facility: CLINIC | Age: 38
End: 2018-08-15

## 2018-08-15 ENCOUNTER — PATIENT MESSAGE (OUTPATIENT)
Dept: OBGYN CLINIC | Facility: CLINIC | Age: 38
End: 2018-08-15

## 2018-08-15 ENCOUNTER — OFFICE VISIT (OUTPATIENT)
Dept: OBGYN CLINIC | Facility: CLINIC | Age: 38
End: 2018-08-15
Payer: COMMERCIAL

## 2018-08-15 VITALS
HEART RATE: 91 BPM | DIASTOLIC BLOOD PRESSURE: 79 MMHG | BODY MASS INDEX: 30 KG/M2 | WEIGHT: 179 LBS | SYSTOLIC BLOOD PRESSURE: 125 MMHG

## 2018-08-15 DIAGNOSIS — N89.8 VAGINAL DISCHARGE: Primary | ICD-10-CM

## 2018-08-15 PROCEDURE — 99213 OFFICE O/P EST LOW 20 MIN: CPT | Performed by: OBSTETRICS & GYNECOLOGY

## 2018-08-15 RX ORDER — NORETHINDRONE ACETATE AND ETHINYL ESTRADIOL 1.5; 3 MG/1; UG/1
TABLET ORAL
Refills: 2 | COMMUNITY
Start: 2018-07-10 | End: 2019-04-20

## 2018-08-15 NOTE — TELEPHONE ENCOUNTER
BO has opening at 6:50 pm in [facility]. Please offer appt to pt. If pt cannot take appt then can schedule in any available slot.

## 2018-08-16 ENCOUNTER — PATIENT MESSAGE (OUTPATIENT)
Dept: OBGYN CLINIC | Facility: CLINIC | Age: 38
End: 2018-08-16

## 2018-08-16 NOTE — H&P
HPI:  The patient is a 42 yo F c/o 7-10d of malodorous discharge and vulvovaginal itching. No changes in soaps/detergents. No douching. Waxes pubic hair. No recent abx. Tried monistat7 w/o relief.      Reviewed medical and surgical history below denies shortness of breath  Gastrointestinal:  denies heartburn, abdominal pain, diarrhea or constipation  Genitourinary:  See HPI  Musculoskeletal:  denies back pain. Skin/Breast:  Denies any breast pain, lumps, or discharge.    Neurological:  denies head

## 2018-08-16 NOTE — TELEPHONE ENCOUNTER
From: Deisy Baez  Sent: 8/16/2018 11:53 AM CDT  To: Em [facility] Ob/Gyne Clinical Staff  Subject: RE: Test Results Question    I know the doctor told me to buy something to use until he gets the results, but I cant remember what it was.  Could you please ask him

## 2018-08-17 ENCOUNTER — TELEPHONE (OUTPATIENT)
Dept: OBGYN CLINIC | Facility: CLINIC | Age: 38
End: 2018-08-17

## 2018-08-17 LAB
GENITAL VAGINOSIS SCREEN: NEGATIVE
TRICHOMONAS SCREEN: NEGATIVE

## 2018-08-17 NOTE — TELEPHONE ENCOUNTER
----- Message from Dontae Salmeron DO sent at 8/17/2018  1:51 PM CDT -----  Please notify of results.   Would recommend rephresh for now given negative culture

## 2018-09-11 ENCOUNTER — LAB SERVICES (OUTPATIENT)
Dept: OTHER | Age: 38
End: 2018-09-11

## 2018-09-11 ENCOUNTER — CHARTING TRANS (OUTPATIENT)
Dept: OTHER | Age: 38
End: 2018-09-11

## 2018-09-11 LAB — RAPID STREP GROUP A: POSITIVE

## 2018-09-12 ENCOUNTER — TELEPHONE (OUTPATIENT)
Dept: OBGYN CLINIC | Facility: CLINIC | Age: 38
End: 2018-09-12

## 2018-09-12 NOTE — TELEPHONE ENCOUNTER
Pt is on amoxicillin and states every time she takes antibiotics, she gets a yeast infection. Pt forgot to ask the md that rx'd the antibiotics for the diflucan. Pt is wondering if BO will give it to her. Message to [address] [place], [place] [address]marily Loaiza.

## 2018-09-14 RX ORDER — FLUCONAZOLE 150 MG/1
150 TABLET ORAL ONCE
Qty: 1 TABLET | Refills: 0 | Status: SHIPPED | OUTPATIENT
Start: 2018-09-14 | End: 2018-09-14

## 2018-11-01 VITALS
BODY MASS INDEX: 32.18 KG/M2 | TEMPERATURE: 100 F | OXYGEN SATURATION: 100 % | HEIGHT: 65 IN | WEIGHT: 193.13 LBS | DIASTOLIC BLOOD PRESSURE: 82 MMHG | SYSTOLIC BLOOD PRESSURE: 114 MMHG | HEART RATE: 114 BPM

## 2018-11-13 ENCOUNTER — OFFICE VISIT (OUTPATIENT)
Dept: INTERNAL MEDICINE CLINIC | Facility: CLINIC | Age: 38
End: 2018-11-13
Payer: COMMERCIAL

## 2018-11-13 VITALS
DIASTOLIC BLOOD PRESSURE: 73 MMHG | WEIGHT: 171 LBS | HEART RATE: 83 BPM | BODY MASS INDEX: 28.49 KG/M2 | SYSTOLIC BLOOD PRESSURE: 117 MMHG | HEIGHT: 65 IN | TEMPERATURE: 99 F

## 2018-11-13 DIAGNOSIS — J06.9 ACUTE URI: Primary | ICD-10-CM

## 2018-11-13 PROCEDURE — 99212 OFFICE O/P EST SF 10 MIN: CPT | Performed by: INTERNAL MEDICINE

## 2018-11-13 PROCEDURE — 99213 OFFICE O/P EST LOW 20 MIN: CPT | Performed by: INTERNAL MEDICINE

## 2018-11-13 NOTE — PROGRESS NOTES
Sadia Moyer is a 41year old female.  Patient presents with:  Sore Throat  Ear Pain    HPI:   Since Friday, 4 days ago, she has had persistent symptoms of nasal congestion with green drainage, bilateral ear pain especially at night, sore throat and nonprodu and agrees to the plan.     Al Moreno MD  11/13/2018  9:50 AM

## 2018-11-13 NOTE — PATIENT INSTRUCTIONS
Please treat symptoms with pseudoephedrine, warm salt water gargles and Robitussin as needed. Call if no better.

## 2018-11-27 VITALS
TEMPERATURE: 110 F | OXYGEN SATURATION: 97 % | DIASTOLIC BLOOD PRESSURE: 70 MMHG | RESPIRATION RATE: 17 BRPM | HEART RATE: 97 BPM | SYSTOLIC BLOOD PRESSURE: 114 MMHG | WEIGHT: 178.66 LBS | HEIGHT: 65 IN | BODY MASS INDEX: 29.77 KG/M2

## 2018-11-30 ENCOUNTER — PATIENT MESSAGE (OUTPATIENT)
Dept: INTERNAL MEDICINE CLINIC | Facility: CLINIC | Age: 38
End: 2018-11-30

## 2018-11-30 ENCOUNTER — TELEPHONE (OUTPATIENT)
Dept: OTHER | Age: 38
End: 2018-11-30

## 2018-11-30 NOTE — TELEPHONE ENCOUNTER
Regarding: Other  Contact: [phone]  ----- Message from Generic, Mychart sent at 11/30/2018 11:27 AM CST -----    I've had this cough for almost 3 weeks now and lately I've been getting coughing attacks to the point i throw up and today when I coughed

## 2018-11-30 NOTE — TELEPHONE ENCOUNTER
Patient indicated that saw Dr Genesis George on 11/13/18 and was taking robitussin. Cough went away for only 1 week and returned again on 11/23/18. Cough is dry. Gets coughing fits to the point of gagging and vomiting since 11/26/18.  Has alittle bit of right ear pa

## 2018-11-30 NOTE — TELEPHONE ENCOUNTER
From: Natalie Inman  To: Blaire Giles MD  Sent: 11/30/2018 11:27 AM CST  Subject: Other    I've had this cough for almost 3 weeks now and lately I've been getting coughing attacks to the point i throw up and today when I coughed I throw up a little blood

## 2018-12-03 ENCOUNTER — OFFICE VISIT (OUTPATIENT)
Dept: INTERNAL MEDICINE CLINIC | Facility: CLINIC | Age: 38
End: 2018-12-03
Payer: COMMERCIAL

## 2018-12-03 VITALS
HEIGHT: 65 IN | SYSTOLIC BLOOD PRESSURE: 122 MMHG | DIASTOLIC BLOOD PRESSURE: 82 MMHG | HEART RATE: 88 BPM | TEMPERATURE: 99 F | BODY MASS INDEX: 29.24 KG/M2 | RESPIRATION RATE: 18 BRPM | WEIGHT: 175.5 LBS

## 2018-12-03 DIAGNOSIS — J06.9 ACUTE URI: Primary | ICD-10-CM

## 2018-12-03 PROCEDURE — 99213 OFFICE O/P EST LOW 20 MIN: CPT | Performed by: INTERNAL MEDICINE

## 2018-12-03 PROCEDURE — 99212 OFFICE O/P EST SF 10 MIN: CPT | Performed by: INTERNAL MEDICINE

## 2018-12-03 RX ORDER — BENZONATATE 100 MG/1
100 CAPSULE ORAL 3 TIMES DAILY PRN
Qty: 20 CAPSULE | Refills: 0 | Status: SHIPPED | OUTPATIENT
Start: 2018-12-03 | End: 2019-04-20

## 2018-12-03 RX ORDER — NORETHINDRONE ACETATE AND ETHINYL ESTRADIOL .03; 1.5 MG/1; MG/1
TABLET ORAL
COMMUNITY
End: 2018-12-03 | Stop reason: ALTCHOICE

## 2018-12-03 RX ORDER — AMOXICILLIN 500 MG/1
CAPSULE ORAL
Refills: 0 | COMMUNITY
Start: 2018-09-11 | End: 2018-12-03 | Stop reason: ALTCHOICE

## 2018-12-03 RX ORDER — FLUCONAZOLE 150 MG/1
TABLET ORAL
Refills: 0 | COMMUNITY
Start: 2018-09-14 | End: 2018-12-03 | Stop reason: ALTCHOICE

## 2018-12-03 NOTE — TELEPHONE ENCOUNTER
Spoke with patient who confirmed she has an appointment pending with Dr. Michelle Wallace for today 12/3/18. Patient was advised to seek immediate medical care if symptoms get worse. Patient voiced understanding and agreed with the plan of care.

## 2018-12-04 NOTE — PROGRESS NOTES
Bianka Fernandez is a 41year old female. Patient presents with:  Cough: Patient here with c/o non-productive cough, onset 1 week.     HPI:   For the past 1 week she has had a persistent nonproductive cough, worse in the evening, and sometimes causing nausea and without erythema ulceration swelling or exudate  NECK: Supple without mass or lymphadenopathy  LUNGS: Resonant to percussion and clear to auscultation without crackles or wheezes    ASSESSMENT AND PLAN:   1.  Acute URI  Likely viral.  Symptomatic treatment-

## 2018-12-04 NOTE — PATIENT INSTRUCTIONS
Please use benzonatate 100 mg 3 times daily as needed for coughing. Call if not soon better as I would then prescribe an antibiotic.

## 2019-01-14 ENCOUNTER — OFFICE VISIT (OUTPATIENT)
Dept: OBGYN CLINIC | Facility: CLINIC | Age: 39
End: 2019-01-14
Payer: COMMERCIAL

## 2019-01-14 VITALS
HEART RATE: 75 BPM | SYSTOLIC BLOOD PRESSURE: 114 MMHG | BODY MASS INDEX: 29 KG/M2 | DIASTOLIC BLOOD PRESSURE: 77 MMHG | WEIGHT: 175 LBS

## 2019-01-14 DIAGNOSIS — N89.8 VAGINAL IRRITATION: Primary | ICD-10-CM

## 2019-01-14 DIAGNOSIS — N89.8 VAGINAL DISCHARGE: ICD-10-CM

## 2019-01-14 PROCEDURE — 99213 OFFICE O/P EST LOW 20 MIN: CPT | Performed by: CLINICAL NURSE SPECIALIST

## 2019-01-14 RX ORDER — METRONIDAZOLE 500 MG/1
500 TABLET ORAL 2 TIMES DAILY
Qty: 14 TABLET | Refills: 0 | Status: SHIPPED | OUTPATIENT
Start: 2019-01-14 | End: 2019-01-21

## 2019-01-16 LAB
GENITAL VAGINOSIS SCREEN: NEGATIVE
TRICHOMONAS SCREEN: NEGATIVE

## 2019-01-16 NOTE — PROGRESS NOTES
Boy Wood is a 41year old female [id number] Patient's last menstrual period was 01/04/2019. Patient presents with:  Gyn Exam: Vaginal dryness. Her with c/o external vaginal irritation and dryness. Has had discharge in the past but not currently.  No change 1.5/30 1.5-30 MG-MCG Oral Tab, TK 1 T PO D, Disp: , Rfl: 2  •  Olopatadine HCl (PATANOL) 0.1 % Ophthalmic Solution, Place 1 drop into both eyes 2 (two) times daily. , Disp: 5 mL, Rfl: 0    ALLERGIES:    Seasonal                      Review of Systems:  Heri exam  Discussed strategies to decrease risk for BV or yeast infections, including mild soaps, wiping front to back, cotton underwear, avoiding sweaty or wet clothing, decreasing sugar in diet, taking acidophilus supplements.   Admits daily pad use-recommend

## 2019-03-25 ENCOUNTER — PATIENT MESSAGE (OUTPATIENT)
Dept: OBGYN CLINIC | Facility: CLINIC | Age: 39
End: 2019-03-25

## 2019-03-26 ENCOUNTER — TELEPHONE (OUTPATIENT)
Dept: OBGYN CLINIC | Facility: CLINIC | Age: 39
End: 2019-03-26

## 2019-03-26 NOTE — TELEPHONE ENCOUNTER
From: Felisha Rivera  To: Anai Desai DO  Sent: 3/25/2019 12:48 PM CDT  Subject: Non-Urgent Medical Question    I just found out im pregnant about 4 weeks, when would my 1st appt be? From what i remember from my last one at 8 weeks?

## 2019-03-26 NOTE — TELEPHONE ENCOUNTER
Pt reports LMP 2/28/19 and positive hpt. Pt delivered with our group in 2017. Pt agrees to rotate PN appts with our 2 male and 4 female doctors. Pt prefers to come in person for OBN and accepted appt on 4/20.  Advised pt to hydrated before appt and arrive 1

## 2019-04-20 ENCOUNTER — LAB ENCOUNTER (OUTPATIENT)
Dept: LAB | Facility: HOSPITAL | Age: 39
End: 2019-04-20
Attending: OBSTETRICS & GYNECOLOGY
Payer: COMMERCIAL

## 2019-04-20 ENCOUNTER — NURSE ONLY (OUTPATIENT)
Dept: OBGYN CLINIC | Facility: CLINIC | Age: 39
End: 2019-04-20
Payer: COMMERCIAL

## 2019-04-20 VITALS — WEIGHT: 178.38 LBS | BODY MASS INDEX: 30 KG/M2

## 2019-04-20 DIAGNOSIS — Z34.91 ENCOUNTER FOR SUPERVISION OF NORMAL PREGNANCY IN FIRST TRIMESTER, UNSPECIFIED GRAVIDITY: Primary | ICD-10-CM

## 2019-04-20 DIAGNOSIS — Z34.91 ENCOUNTER FOR SUPERVISION OF NORMAL PREGNANCY IN FIRST TRIMESTER, UNSPECIFIED GRAVIDITY: ICD-10-CM

## 2019-04-20 PROCEDURE — 86762 RUBELLA ANTIBODY: CPT

## 2019-04-20 PROCEDURE — 81025 URINE PREGNANCY TEST: CPT | Performed by: OBSTETRICS & GYNECOLOGY

## 2019-04-20 PROCEDURE — 87147 CULTURE TYPE IMMUNOLOGIC: CPT

## 2019-04-20 PROCEDURE — 87340 HEPATITIS B SURFACE AG IA: CPT

## 2019-04-20 PROCEDURE — 36415 COLL VENOUS BLD VENIPUNCTURE: CPT

## 2019-04-20 PROCEDURE — 87086 URINE CULTURE/COLONY COUNT: CPT

## 2019-04-20 PROCEDURE — 86901 BLOOD TYPING SEROLOGIC RH(D): CPT

## 2019-04-20 PROCEDURE — 85025 COMPLETE CBC W/AUTO DIFF WBC: CPT

## 2019-04-20 PROCEDURE — 86850 RBC ANTIBODY SCREEN: CPT

## 2019-04-20 PROCEDURE — 87389 HIV-1 AG W/HIV-1&-2 AB AG IA: CPT

## 2019-04-20 PROCEDURE — 86900 BLOOD TYPING SEROLOGIC ABO: CPT

## 2019-04-20 PROCEDURE — 86780 TREPONEMA PALLIDUM: CPT

## 2019-04-20 RX ORDER — CHOLECALCIFEROL (VITAMIN D3) 25 MCG
1 TABLET,CHEWABLE ORAL DAILY
COMMUNITY
End: 2019-09-18 | Stop reason: ALTCHOICE

## 2019-04-20 NOTE — PROGRESS NOTES
Pt seen for OBN appt today with no complaints. Normal PN labs and 1hr gtt ordered. Pt advised all labs must be completed and resulted prior to MD appt. Assisted pt with hsceduling NPN appt with JOCE for 5/4/19 at 8:30am.     [facility] Guzman-Sachs Disease No    Thalassemia No    Other inherited genetic or chromosomal disorders No    Patient or baby's father had a child with birth defects not listed above No    Previous miscarriages or stillborn Yes Maternal Aunt 3 SABs       MISC    Infa

## 2019-04-22 ENCOUNTER — PATIENT MESSAGE (OUTPATIENT)
Dept: OBGYN CLINIC | Facility: CLINIC | Age: 39
End: 2019-04-22

## 2019-04-22 NOTE — TELEPHONE ENCOUNTER
From: Sana Party  To: Jake Jha DO  Sent: 4/22/2019 5:55 PM CDT  Subject: Other    for the 1 hr glucose test, do i have to fast? Also can i have activia yogurt?  Thanks

## 2019-04-25 PROBLEM — B95.1 GBS (GROUP B STREPTOCOCCUS) UTI COMPLICATING PREGNANCY: Status: ACTIVE | Noted: 2019-04-25

## 2019-04-25 PROBLEM — O23.40 GBS (GROUP B STREPTOCOCCUS) UTI COMPLICATING PREGNANCY: Status: ACTIVE | Noted: 2019-04-25

## 2019-04-27 ENCOUNTER — LAB ENCOUNTER (OUTPATIENT)
Dept: LAB | Facility: HOSPITAL | Age: 39
End: 2019-04-27
Attending: OBSTETRICS & GYNECOLOGY
Payer: COMMERCIAL

## 2019-04-27 DIAGNOSIS — Z34.91 ENCOUNTER FOR SUPERVISION OF NORMAL PREGNANCY IN FIRST TRIMESTER, UNSPECIFIED GRAVIDITY: ICD-10-CM

## 2019-04-27 PROCEDURE — 82950 GLUCOSE TEST: CPT

## 2019-04-27 PROCEDURE — 36415 COLL VENOUS BLD VENIPUNCTURE: CPT

## 2019-05-04 ENCOUNTER — INITIAL PRENATAL (OUTPATIENT)
Dept: OBGYN CLINIC | Facility: CLINIC | Age: 39
End: 2019-05-04
Payer: COMMERCIAL

## 2019-05-04 ENCOUNTER — TELEPHONE (OUTPATIENT)
Dept: OBGYN CLINIC | Facility: CLINIC | Age: 39
End: 2019-05-04

## 2019-05-04 ENCOUNTER — HOSPITAL ENCOUNTER (OUTPATIENT)
Dept: ULTRASOUND IMAGING | Facility: HOSPITAL | Age: 39
Discharge: HOME | End: 2019-05-04
Attending: OBSTETRICS & GYNECOLOGY
Payer: COMMERCIAL

## 2019-05-04 ENCOUNTER — MOBILE ENCOUNTER (OUTPATIENT)
Dept: OBGYN CLINIC | Facility: CLINIC | Age: 39
End: 2019-05-04

## 2019-05-04 VITALS
HEART RATE: 89 BPM | DIASTOLIC BLOOD PRESSURE: 76 MMHG | SYSTOLIC BLOOD PRESSURE: 114 MMHG | BODY MASS INDEX: 30 KG/M2 | WEIGHT: 179.38 LBS

## 2019-05-04 DIAGNOSIS — O09.291 PRIOR POOR OBSTETRICAL HISTORY IN FIRST TRIMESTER, ANTEPARTUM: ICD-10-CM

## 2019-05-04 DIAGNOSIS — O26.841 SIGNIFICANT DISCREPANCY BETWEEN UTERINE SIZE AND CLINICAL DATES, ANTEPARTUM, FIRST TRIMESTER: ICD-10-CM

## 2019-05-04 DIAGNOSIS — Z34.91 ENCOUNTER FOR SUPERVISION OF NORMAL PREGNANCY IN FIRST TRIMESTER, UNSPECIFIED GRAVIDITY: Primary | ICD-10-CM

## 2019-05-04 DIAGNOSIS — O26.849 UTERINE SIZE DATE DISCREPANCY PREGNANCY: Primary | ICD-10-CM

## 2019-05-04 PROBLEM — O09.529 ANTEPARTUM MULTIGRAVIDA OF ADVANCED MATERNAL AGE: Status: ACTIVE | Noted: 2019-05-04

## 2019-05-04 PROCEDURE — 76817 TRANSVAGINAL US OBSTETRIC: CPT | Performed by: OBSTETRICS & GYNECOLOGY

## 2019-05-04 PROCEDURE — 81002 URINALYSIS NONAUTO W/O SCOPE: CPT | Performed by: OBSTETRICS & GYNECOLOGY

## 2019-05-04 PROCEDURE — 76801 OB US < 14 WKS SINGLE FETUS: CPT | Performed by: OBSTETRICS & GYNECOLOGY

## 2019-05-04 NOTE — PROGRESS NOTES
Angelica Wilkerson at visit. Sure LMP, 28d cycles and early UPT. Office US w/o prepped bladder shows RF uterus with sac and ? Fetal pole toward fundus. No FHR seen or with doppler. Plan hold and call US at 3 PM today. Dr Gopal Whitehead on call is aware.  When we prove viab

## 2019-05-05 ENCOUNTER — TELEPHONE (OUTPATIENT)
Dept: OBGYN CLINIC | Facility: CLINIC | Age: 39
End: 2019-05-05

## 2019-05-05 NOTE — TELEPHONE ENCOUNTER
Please call patient with central scheduling phone number. She needs to schedule an OB US next weekend (Pt aware and order has been placed).

## 2019-05-05 NOTE — TELEPHONE ENCOUNTER
Us Ob W/ Ev 1st Trimester (MIL=[id number])    Result Date: 5/4/2019  CONCLUSION: 1. Intrauterine gestational sac with questionable fetal echo complex. 2. No definite fetal heart tones but may be too early. Suggest follow-up in 7-10 days     Dictated by (CST): Sadia Isaacs MD on 5/04/2019 at 15:50     Approved by (CST): Sadia Isaacs MD on 5/04/2019 at 15:54              On call--paged by [employer] with hold and call results. Pt 9w2d with US showing 6w3d IUP w/o FHT. US recommending repeat imaging in 7-10 days as noted above. I spoke with Irina Weller regarding results. She states sure LMP with monthly regular cycles. Discussed possible wrong dates vs potential for impending miscarriage given discrepancy between LMP and FP measurments. Discussed mgmt options including serial hcgs vs repeat US in 7-10 days. After discussion with Irina Weller, plan to repeat US in 7 days. Patient denies cramping or bleeding. Pt advised to contact our office if either occurs. Order placed for repeat US.

## 2019-05-06 ENCOUNTER — TELEPHONE (OUTPATIENT)
Dept: OBGYN CLINIC | Facility: CLINIC | Age: 39
End: 2019-05-06

## 2019-05-06 NOTE — TELEPHONE ENCOUNTER
Informed pt that Aleida Vencesmarily Loaiza wanted her to call central scheduling and schedule an ob u/s next weekend. Pt states that she will schedule it on Friday, because she is off on Friday. Sent to Aleida Vencesmarily Loaiza as a FYI.

## 2019-05-10 ENCOUNTER — TELEPHONE (OUTPATIENT)
Dept: OBGYN CLINIC | Facility: CLINIC | Age: 39
End: 2019-05-10

## 2019-05-10 ENCOUNTER — HOSPITAL ENCOUNTER (OUTPATIENT)
Dept: ULTRASOUND IMAGING | Facility: HOSPITAL | Age: 39
Discharge: HOME | End: 2019-05-10
Attending: OBSTETRICS & GYNECOLOGY
Payer: COMMERCIAL

## 2019-05-10 DIAGNOSIS — O26.849 UTERINE SIZE DATE DISCREPANCY PREGNANCY: ICD-10-CM

## 2019-05-10 DIAGNOSIS — O02.1 MISSED ABORTION: Primary | ICD-10-CM

## 2019-05-10 PROCEDURE — 76817 TRANSVAGINAL US OBSTETRIC: CPT | Performed by: OBSTETRICS & GYNECOLOGY

## 2019-05-10 PROCEDURE — 76801 OB US < 14 WKS SINGLE FETUS: CPT | Performed by: OBSTETRICS & GYNECOLOGY

## 2019-05-10 NOTE — TELEPHONE ENCOUNTER
Paged on call for US results. I was in surgery and just called Linda back now. US confirms 6 week IUFD. She has no bleeding or pain. We discussed options of expectant, Cytotec as well as D and C management with benefits / risks.  After discussion, she wants

## 2019-05-13 NOTE — TELEPHONE ENCOUNTER
OB GYN SURGICAL SCHEDULING    Assessment: Missed     Pre-Operative Procedure:  Dilatation and Curettage    Side:     In / on:     Date:  19 in AM with Dr Salazar or [phone] with me.  I have a 1330  assist with Dr Juliet Aldana    Admission:  Obdulio Oliveros

## 2019-05-13 NOTE — TELEPHONE ENCOUNTER
Lmtcb. Please relay info:    Patient scheduled 5/16/19 930 dilation and curettage with Dr. Keyshawn Kovacs. Detailed instructions routed via my chart and can be found via the letters tab.

## 2019-05-13 NOTE — TELEPHONE ENCOUNTER
Pt would like to do a D&C and do it on 5/16. Pt would like a call back from surgery scheduler when it can be scheduled.      Sent to Matt.

## 2019-05-16 ENCOUNTER — ANESTHESIA EVENT (OUTPATIENT)
Dept: SURGERY | Facility: HOSPITAL | Age: 39
End: 2019-05-16
Payer: COMMERCIAL

## 2019-05-16 ENCOUNTER — HOSPITAL ENCOUNTER (OUTPATIENT)
Facility: HOSPITAL | Age: 39
Setting detail: HOSPITAL OUTPATIENT SURGERY
Discharge: HOME | End: 2019-05-16
Attending: OBSTETRICS & GYNECOLOGY | Admitting: OBSTETRICS & GYNECOLOGY
Payer: COMMERCIAL

## 2019-05-16 ENCOUNTER — ANESTHESIA (OUTPATIENT)
Dept: SURGERY | Facility: HOSPITAL | Age: 39
End: 2019-05-16
Payer: COMMERCIAL

## 2019-05-16 VITALS
RESPIRATION RATE: 15 BRPM | BODY MASS INDEX: 29.32 KG/M2 | OXYGEN SATURATION: 99 % | HEIGHT: 65 IN | HEART RATE: 62 BPM | WEIGHT: 176 LBS | DIASTOLIC BLOOD PRESSURE: 56 MMHG | SYSTOLIC BLOOD PRESSURE: 102 MMHG | TEMPERATURE: 98 F

## 2019-05-16 DIAGNOSIS — O02.1 MISSED ABORTION: ICD-10-CM

## 2019-05-16 DIAGNOSIS — O02.1 FETAL DEMISE BEFORE 20 WEEKS WITH RETENTION OF DEAD FETUS: Primary | ICD-10-CM

## 2019-05-16 PROCEDURE — 10D17ZZ EXTRACTION OF PRODUCTS OF CONCEPTION, RETAINED, VIA NATURAL OR ARTIFICIAL OPENING: ICD-10-PCS | Performed by: OBSTETRICS & GYNECOLOGY

## 2019-05-16 PROCEDURE — 59820 CARE OF MISCARRIAGE: CPT | Performed by: OBSTETRICS & GYNECOLOGY

## 2019-05-16 RX ORDER — NALOXONE HYDROCHLORIDE 0.4 MG/ML
80 INJECTION, SOLUTION INTRAMUSCULAR; INTRAVENOUS; SUBCUTANEOUS AS NEEDED
Status: DISCONTINUED | OUTPATIENT
Start: 2019-05-16 | End: 2019-05-16

## 2019-05-16 RX ORDER — SODIUM CHLORIDE, SODIUM LACTATE, POTASSIUM CHLORIDE, CALCIUM CHLORIDE 600; 310; 30; 20 MG/100ML; MG/100ML; MG/100ML; MG/100ML
INJECTION, SOLUTION INTRAVENOUS CONTINUOUS
Status: DISCONTINUED | OUTPATIENT
Start: 2019-05-16 | End: 2019-05-16

## 2019-05-16 RX ORDER — ONDANSETRON 2 MG/ML
4 INJECTION INTRAMUSCULAR; INTRAVENOUS EVERY 8 HOURS PRN
Status: DISCONTINUED | OUTPATIENT
Start: 2019-05-16 | End: 2019-05-16

## 2019-05-16 RX ORDER — IBUPROFEN 600 MG/1
600 TABLET ORAL EVERY 6 HOURS
Status: DISCONTINUED | OUTPATIENT
Start: 2019-05-16 | End: 2019-05-16

## 2019-05-16 RX ORDER — HYDROMORPHONE HYDROCHLORIDE 1 MG/ML
0.4 INJECTION, SOLUTION INTRAMUSCULAR; INTRAVENOUS; SUBCUTANEOUS EVERY 5 MIN PRN
Status: DISCONTINUED | OUTPATIENT
Start: 2019-05-16 | End: 2019-05-16

## 2019-05-16 RX ORDER — FAMOTIDINE 20 MG/1
20 TABLET ORAL ONCE
Status: COMPLETED | OUTPATIENT
Start: 2019-05-16 | End: 2019-05-16

## 2019-05-16 RX ORDER — MORPHINE SULFATE 2 MG/ML
2 INJECTION, SOLUTION INTRAMUSCULAR; INTRAVENOUS EVERY 10 MIN PRN
Status: DISCONTINUED | OUTPATIENT
Start: 2019-05-16 | End: 2019-05-16

## 2019-05-16 RX ORDER — MORPHINE SULFATE 4 MG/ML
4 INJECTION, SOLUTION INTRAMUSCULAR; INTRAVENOUS EVERY 10 MIN PRN
Status: DISCONTINUED | OUTPATIENT
Start: 2019-05-16 | End: 2019-05-16

## 2019-05-16 RX ORDER — METOCLOPRAMIDE 10 MG/1
10 TABLET ORAL ONCE
Status: DISCONTINUED | OUTPATIENT
Start: 2019-05-16 | End: 2019-05-16 | Stop reason: HOSPADM

## 2019-05-16 RX ORDER — CEFAZOLIN SODIUM/WATER 2 G/20 ML
2 SYRINGE (ML) INTRAVENOUS ONCE
Status: CANCELLED | OUTPATIENT
Start: 2019-05-16 | End: 2019-05-16

## 2019-05-16 RX ORDER — ONDANSETRON 4 MG/1
4 TABLET, FILM COATED ORAL EVERY 8 HOURS PRN
Status: DISCONTINUED | OUTPATIENT
Start: 2019-05-16 | End: 2019-05-16

## 2019-05-16 RX ORDER — ACETAMINOPHEN 500 MG
1000 TABLET ORAL ONCE
Status: COMPLETED | OUTPATIENT
Start: 2019-05-16 | End: 2019-05-16

## 2019-05-16 RX ORDER — ONDANSETRON 2 MG/ML
4 INJECTION INTRAMUSCULAR; INTRAVENOUS ONCE AS NEEDED
Status: DISCONTINUED | OUTPATIENT
Start: 2019-05-16 | End: 2019-05-16

## 2019-05-16 RX ORDER — MORPHINE SULFATE 10 MG/ML
6 INJECTION, SOLUTION INTRAMUSCULAR; INTRAVENOUS EVERY 10 MIN PRN
Status: DISCONTINUED | OUTPATIENT
Start: 2019-05-16 | End: 2019-05-16

## 2019-05-16 RX ORDER — KETOROLAC TROMETHAMINE 30 MG/ML
INJECTION, SOLUTION INTRAMUSCULAR; INTRAVENOUS AS NEEDED
Status: DISCONTINUED | OUTPATIENT
Start: 2019-05-16 | End: 2019-05-16 | Stop reason: SURG

## 2019-05-16 RX ORDER — ONDANSETRON 2 MG/ML
INJECTION INTRAMUSCULAR; INTRAVENOUS AS NEEDED
Status: DISCONTINUED | OUTPATIENT
Start: 2019-05-16 | End: 2019-05-16 | Stop reason: SURG

## 2019-05-16 RX ORDER — SODIUM CHLORIDE, SODIUM LACTATE, POTASSIUM CHLORIDE, CALCIUM CHLORIDE 600; 310; 30; 20 MG/100ML; MG/100ML; MG/100ML; MG/100ML
INJECTION, SOLUTION INTRAVENOUS CONTINUOUS
Status: CANCELLED | OUTPATIENT
Start: 2019-05-16

## 2019-05-16 RX ORDER — HYDROMORPHONE HYDROCHLORIDE 1 MG/ML
0.6 INJECTION, SOLUTION INTRAMUSCULAR; INTRAVENOUS; SUBCUTANEOUS EVERY 5 MIN PRN
Status: DISCONTINUED | OUTPATIENT
Start: 2019-05-16 | End: 2019-05-16

## 2019-05-16 RX ORDER — HYDROCODONE BITARTRATE AND ACETAMINOPHEN 5; 325 MG/1; MG/1
2 TABLET ORAL AS NEEDED
Status: DISCONTINUED | OUTPATIENT
Start: 2019-05-16 | End: 2019-05-16

## 2019-05-16 RX ORDER — PROCHLORPERAZINE EDISYLATE 5 MG/ML
5 INJECTION INTRAMUSCULAR; INTRAVENOUS ONCE AS NEEDED
Status: DISCONTINUED | OUTPATIENT
Start: 2019-05-16 | End: 2019-05-16

## 2019-05-16 RX ORDER — DEXTROSE MONOHYDRATE 25 G/50ML
50 INJECTION, SOLUTION INTRAVENOUS
Status: DISCONTINUED | OUTPATIENT
Start: 2019-05-16 | End: 2019-05-16

## 2019-05-16 RX ORDER — MIDAZOLAM HYDROCHLORIDE 1 MG/ML
INJECTION INTRAMUSCULAR; INTRAVENOUS AS NEEDED
Status: DISCONTINUED | OUTPATIENT
Start: 2019-05-16 | End: 2019-05-16 | Stop reason: SURG

## 2019-05-16 RX ORDER — LIDOCAINE HYDROCHLORIDE 10 MG/ML
INJECTION, SOLUTION EPIDURAL; INFILTRATION; INTRACAUDAL; PERINEURAL AS NEEDED
Status: DISCONTINUED | OUTPATIENT
Start: 2019-05-16 | End: 2019-05-16 | Stop reason: SURG

## 2019-05-16 RX ORDER — HYDROMORPHONE HYDROCHLORIDE 1 MG/ML
0.2 INJECTION, SOLUTION INTRAMUSCULAR; INTRAVENOUS; SUBCUTANEOUS EVERY 5 MIN PRN
Status: DISCONTINUED | OUTPATIENT
Start: 2019-05-16 | End: 2019-05-16

## 2019-05-16 RX ORDER — HALOPERIDOL 5 MG/ML
0.25 INJECTION INTRAMUSCULAR ONCE AS NEEDED
Status: DISCONTINUED | OUTPATIENT
Start: 2019-05-16 | End: 2019-05-16

## 2019-05-16 RX ORDER — DEXAMETHASONE SODIUM PHOSPHATE 4 MG/ML
VIAL (ML) INJECTION AS NEEDED
Status: DISCONTINUED | OUTPATIENT
Start: 2019-05-16 | End: 2019-05-16 | Stop reason: SURG

## 2019-05-16 RX ORDER — HYDROCODONE BITARTRATE AND ACETAMINOPHEN 5; 325 MG/1; MG/1
1 TABLET ORAL AS NEEDED
Status: DISCONTINUED | OUTPATIENT
Start: 2019-05-16 | End: 2019-05-16

## 2019-05-16 RX ADMIN — SODIUM CHLORIDE, SODIUM LACTATE, POTASSIUM CHLORIDE, CALCIUM CHLORIDE: 600; 310; 30; 20 INJECTION, SOLUTION INTRAVENOUS at 09:37:00

## 2019-05-16 RX ADMIN — MIDAZOLAM HYDROCHLORIDE 2 MG: 1 INJECTION INTRAMUSCULAR; INTRAVENOUS at 08:50:00

## 2019-05-16 RX ADMIN — LIDOCAINE HYDROCHLORIDE 50 MG: 10 INJECTION, SOLUTION EPIDURAL; INFILTRATION; INTRACAUDAL; PERINEURAL at 08:55:00

## 2019-05-16 RX ADMIN — DEXAMETHASONE SODIUM PHOSPHATE 4 MG: 4 MG/ML VIAL (ML) INJECTION at 09:05:00

## 2019-05-16 RX ADMIN — KETOROLAC TROMETHAMINE 30 MG: 30 INJECTION, SOLUTION INTRAMUSCULAR; INTRAVENOUS at 09:20:00

## 2019-05-16 RX ADMIN — ONDANSETRON 4 MG: 2 INJECTION INTRAMUSCULAR; INTRAVENOUS at 09:05:00

## 2019-05-16 NOTE — ANESTHESIA POSTPROCEDURE EVALUATION
Patient: Iesha Whelan    Procedure Summary     Date:  19 Room / Location:  [facility] OR  / [facility] OR    Anesthesia Start:  3434 Anesthesia Stop:      Procedure:  DILATION & CURETTAGE SUCTION (N/A Uterus) Diagnosis:       Missed       (Misse

## 2019-05-16 NOTE — ANESTHESIA PREPROCEDURE EVALUATION
Anesthesia PreOp Note    HPI:     Primo Bach is a 42year old female who presents for preoperative consultation requested by: Shamar Clark MD    Date of Surgery: 2019    Procedure(s):  DILATION & CURETTAGE SUCTION  Indication: Missed  [O0 Cancer Maternal Grandmother         liver CA   • Cancer Maternal Grandfather         lung CA   • Lipids Father    • Diabetes Mother    • Lipids Mother    • Hypertension Mother      Social History    Socioeconomic History      Marital status:       S MCHC 32.8 04/20/2019    RDW 13.0 04/20/2019    .0 04/20/2019             Vital Signs: Body mass index is 29.29 kg/m². height is 1.651 m (5' 5\") and weight is 79.8 kg (176 lb). Her oral temperature is 98.5 °F (36.9 °C).  Her blood pressure is 121/

## 2019-05-16 NOTE — DISCHARGE SUMMARY
[facility]    Discharge Summary    Natalie Inman Patient Status:  Hospital Outpatient Surgery    1980 MRN [id number]   Location [place] [address] Attending Solo Barnett MD   Hosp Day # 0 PCP Ana Dunlap MD     D nerve block for your surgery, take extra care not to put any pressure on your arm or hand for 24 hours    It is normal:  · For you to have a sore throat if you had a breathing tube during surgery (while you were asleep!).  The sore throat should get better electricas o serruchos electricos. No gabrielle el cesped con cortadoras electricas. No practique deportes. No dorothea ejercicio.      Para evitar las nauseas, coma menos de lo normal mas o menos la mitad de lo habitual y/o maría elena solo liquidos hasta la [place] s

## 2019-05-16 NOTE — H&P
[address] Patient Status:  Hospital Outpatient Surgery    1980 MRN [id number]   Location [facility] Attending Johanna Addison MD   Hosp Day # 0 PCP MD XIAO Chapa Admission:  prenatal multivitamin plus DHA 27-0.8-228 MG Oral Cap Take 1 capsule by mouth daily. Review of Systems:   Pertinent items are noted in HPI.     Physical Exam:   Vital Signs:  Blood pressure 121/71, pulse 79, temperature 98.5 °F (36.9 °C),

## 2019-05-16 NOTE — OPERATIVE REPORT
[address] [facility]  Operative Note     Graylin Larger Location: OR   [facility] [id number] MRN [id number]   Admission Date 5/16/2019 Operation Date 5/16/2019   Attending Physician Fara Santoro MD Operating Ph

## 2019-05-16 NOTE — ANESTHESIA PROCEDURE NOTES
Airway  Date/Time: 5/16/2019 8:57 AM  Urgency: elective    Airway not difficult    General Information and Staff    Patient location during procedure: OR  Anesthesiologist: Johnny Muñiz MD  Resident/CRNA: Tiffanie Rae CRNA  Performed: BRIAN

## 2019-05-18 PROBLEM — O23.40 GBS (GROUP B STREPTOCOCCUS) UTI COMPLICATING PREGNANCY: Status: RESOLVED | Noted: 2019-04-25 | Resolved: 2019-05-16

## 2019-05-18 PROBLEM — B95.1 GBS (GROUP B STREPTOCOCCUS) UTI COMPLICATING PREGNANCY: Status: RESOLVED | Noted: 2019-04-25 | Resolved: 2019-05-16

## 2019-05-18 PROBLEM — O09.529 ANTEPARTUM MULTIGRAVIDA OF ADVANCED MATERNAL AGE: Status: RESOLVED | Noted: 2019-05-04 | Resolved: 2019-05-16

## 2019-05-22 ENCOUNTER — PATIENT MESSAGE (OUTPATIENT)
Dept: OBGYN CLINIC | Facility: CLINIC | Age: 39
End: 2019-05-22

## 2019-05-22 NOTE — TELEPHONE ENCOUNTER
Message to Alieda Loaiza (on call) to please review results from surgical pathology from 5/16 in CAPs absence.

## 2019-05-22 NOTE — TELEPHONE ENCOUNTER
From: Felisha Rivera  To: Jocelyne Roman MD  Sent: 5/22/2019 10:12 AM CDT  Subject: Test Results Question    I got an email with the test results from my D and C, but I dont understand them, could someone call me to explain it to me.  Thank you

## 2019-05-28 ENCOUNTER — TELEPHONE (OUTPATIENT)
Dept: OBGYN UNIT | Facility: HOSPITAL | Age: 39
End: 2019-05-28

## 2019-06-03 ENCOUNTER — OFFICE VISIT (OUTPATIENT)
Dept: OBGYN CLINIC | Facility: CLINIC | Age: 39
End: 2019-06-03
Payer: COMMERCIAL

## 2019-06-03 VITALS — BODY MASS INDEX: 30 KG/M2 | WEIGHT: 181 LBS

## 2019-06-03 DIAGNOSIS — Z09 POSTOP CHECK: Primary | ICD-10-CM

## 2019-06-03 PROCEDURE — 99024 POSTOP FOLLOW-UP VISIT: CPT | Performed by: OBSTETRICS & GYNECOLOGY

## 2019-06-03 NOTE — PROGRESS NOTES
Deisy Baez is a 42year old female  No LMP recorded. Patient presents with:  Gyn Exam: Post-op  . Had suction D&C performed on 19. Doing well.   Pt still has bleeding but it is very light - she only uses pantiliner and it is only a little spo Alcohol use: Not Currently        Comment: occassional not currently      Drug use: No      Sexual activity: Yes        Partners: Male        Birth control/protection: Pill        Comment: same partner    Lifestyle      Physical activity:        Days per nontender, nondistended, no masses Incisions clean / dry / intact, no erythema nor discharge  Extremities: no edema, no cyanosis  Psychiatric:  Oriented to time, place, person and situation.  Appropriate mood and affect    Pelvic Exam:   External Genitalia:

## 2019-06-28 RX ORDER — NORETHINDRONE ACETATE AND ETHINYL ESTRADIOL AND FERROUS FUMARATE 1.5-30(21)
KIT ORAL
Qty: 84 TABLET | Refills: 0 | OUTPATIENT
Start: 2019-06-28

## 2019-06-28 RX ORDER — NORETHINDRONE ACETATE AND ETHINYL ESTRADIOL 1.5; 3 MG/1; UG/1
TABLET ORAL
Qty: 63 TABLET | Refills: 0 | OUTPATIENT
Start: 2019-06-28

## 2019-06-28 NOTE — TELEPHONE ENCOUNTER
Pt wants to speak to a nurse regarding annual pap  Pt assumed she did need to come in for an annual since she had a D&C done

## 2019-06-28 NOTE — TELEPHONE ENCOUNTER
Pt requesting to restart OCP. Reports she had a NPN and D&C in May and does not think she needs an annual. Advised pt she does need to come in for annual and pt accepted appt on 7/9.

## 2019-07-09 ENCOUNTER — OFFICE VISIT (OUTPATIENT)
Dept: OBGYN CLINIC | Facility: CLINIC | Age: 39
End: 2019-07-09
Payer: COMMERCIAL

## 2019-07-09 VITALS
SYSTOLIC BLOOD PRESSURE: 107 MMHG | DIASTOLIC BLOOD PRESSURE: 73 MMHG | WEIGHT: 186 LBS | HEIGHT: 64.7 IN | BODY MASS INDEX: 31.37 KG/M2 | HEART RATE: 80 BPM

## 2019-07-09 DIAGNOSIS — Z01.419 WELL WOMAN EXAM: Primary | ICD-10-CM

## 2019-07-09 PROBLEM — Z34.90 PREGNANCY: Status: RESOLVED | Noted: 2017-11-01 | Resolved: 2019-07-09

## 2019-07-09 PROCEDURE — 99395 PREV VISIT EST AGE 18-39: CPT | Performed by: OBSTETRICS & GYNECOLOGY

## 2019-07-09 RX ORDER — NORETHINDRONE ACETATE AND ETHINYL ESTRADIOL 1; .02 MG/1; MG/1
1 TABLET ORAL DAILY
Qty: 3 PACKAGE | Refills: 3 | Status: SHIPPED | OUTPATIENT
Start: 2019-07-09 | End: 2019-11-29

## 2019-07-09 NOTE — H&P
HPI:  The patient is a 41 yo F here for WWE. NO GYN c/o.  2 cycles since Suction D&C. Wants to wait 1 year before trying to conceive again. Wants OCPs.   Has been on them in the past.      LPS: 4/10/17 ascus/hpv neg    Reviewed medical and surgical his Lifestyle      Physical activity:        Days per week: Not on file        Minutes per session: Not on file      Stress: Not on file    Relationships      Social connections:        Talks on phone: Not on file        Gets together: Not on file        Atten PHYSICAL EXAM:   Constitutional: well developed, well nourished  Head/Face: normocephalic  Neck/Thyroid: thyroid symmetric, no thyromegaly, no nodules, no adenopathy  Heart: Regular rate and rhythm   Lungs: clear to ascultation bilaterally   Lymphati

## 2019-09-11 ENCOUNTER — NURSE TRIAGE (OUTPATIENT)
Dept: OTHER | Age: 39
End: 2019-09-11

## 2019-09-11 ENCOUNTER — PATIENT MESSAGE (OUTPATIENT)
Dept: INTERNAL MEDICINE CLINIC | Facility: CLINIC | Age: 39
End: 2019-09-11

## 2019-09-11 NOTE — TELEPHONE ENCOUNTER
Most likely this is not due to a serious issue if numbness is localized to a small area of her fingertip. Would simply recommend close observation for now, as it can certainly improve and resolve.   Would recommend appointment if symptoms persist or if oth

## 2019-09-11 NOTE — TELEPHONE ENCOUNTER
----- Message from [place] Loss sent at 9/11/2019  8:19 AM CDT -----  Regarding: Other  Contact: [phone]  Since 1am on 09/11 my right hand index finger the tip has felt numb and i've tried warm water, moving a lot.  Should I be concerned this has never

## 2019-09-11 NOTE — TELEPHONE ENCOUNTER
See TE acute 9/11/19. From: Cruz Speak  To: Chandra Gordon MD  Sent: 9/11/2019  8:19 AM CDT  Subject: Other    Since 1am on 09/11 my right hand index finger the tip has felt numb and i've tried warm water, moving a lot.  Should I be concerned this has

## 2019-09-18 ENCOUNTER — OFFICE VISIT (OUTPATIENT)
Dept: INTERNAL MEDICINE CLINIC | Facility: CLINIC | Age: 39
End: 2019-09-18
Payer: COMMERCIAL

## 2019-09-18 VITALS
DIASTOLIC BLOOD PRESSURE: 78 MMHG | WEIGHT: 178 LBS | HEART RATE: 101 BPM | BODY MASS INDEX: 30.39 KG/M2 | HEIGHT: 64 IN | SYSTOLIC BLOOD PRESSURE: 154 MMHG

## 2019-09-18 DIAGNOSIS — E55.9 VITAMIN D DEFICIENCY: ICD-10-CM

## 2019-09-18 DIAGNOSIS — R20.0 FINGER NUMBNESS: Primary | ICD-10-CM

## 2019-09-18 DIAGNOSIS — Z23 NEED FOR VACCINATION: ICD-10-CM

## 2019-09-18 PROCEDURE — 99214 OFFICE O/P EST MOD 30 MIN: CPT | Performed by: INTERNAL MEDICINE

## 2019-09-18 PROCEDURE — 90471 IMMUNIZATION ADMIN: CPT | Performed by: INTERNAL MEDICINE

## 2019-09-18 PROCEDURE — 90686 IIV4 VACC NO PRSV 0.5 ML IM: CPT | Performed by: INTERNAL MEDICINE

## 2019-09-18 RX ORDER — ERGOCALCIFEROL 1.25 MG/1
50000 CAPSULE ORAL WEEKLY
Qty: 4 CAPSULE | Refills: 3 | Status: SHIPPED | OUTPATIENT
Start: 2019-09-18 | End: 2019-11-29

## 2019-09-18 NOTE — PROGRESS NOTES
Bianka Fernandez is a 42year old female.   Patient presents with:  Numbness: Pt states left index finger is still numb since  9/11/19      HPI:   Pt comes as an urgent visit   C/c right index finger numbness x one finger   C/o above sympt   Planning on having a 154/78 (BP Location: Right arm, Patient Position: Sitting, Cuff Size: adult)   Pulse 101   Ht 5' 4\" (1.626 m)   Wt 178 lb (80.7 kg)   BMI 30.55 kg/m²   GENERAL: well developed, well nourished,in no apparent distress  SKIN: no rashes,no suspicious lesions

## 2019-09-26 ENCOUNTER — HOSPITAL ENCOUNTER (OUTPATIENT)
Dept: ELECTROPHYSIOLOGY | Facility: HOSPITAL | Age: 39
Discharge: HOME | End: 2019-09-26
Attending: INTERNAL MEDICINE
Payer: COMMERCIAL

## 2019-09-26 ENCOUNTER — LAB ENCOUNTER (OUTPATIENT)
Dept: LAB | Facility: HOSPITAL | Age: 39
End: 2019-09-26
Attending: INTERNAL MEDICINE
Payer: COMMERCIAL

## 2019-09-26 DIAGNOSIS — R20.0 FINGER NUMBNESS: ICD-10-CM

## 2019-09-26 LAB
ALBUMIN SERPL-MCNC: 3.7 G/DL (ref 3.4–5)
ALBUMIN/GLOB SERPL: 0.9 {RATIO} (ref 1–2)
ALP LIVER SERPL-CCNC: 52 U/L (ref 37–98)
ALT SERPL-CCNC: 18 U/L (ref 13–56)
ANION GAP SERPL CALC-SCNC: 5 MMOL/L (ref 0–18)
AST SERPL-CCNC: 18 U/L (ref 15–37)
BASOPHILS # BLD AUTO: 0.05 X10(3) UL (ref 0–0.2)
BASOPHILS NFR BLD AUTO: 0.8 %
BILIRUB SERPL-MCNC: 0.3 MG/DL (ref 0.1–2)
BUN BLD-MCNC: 8 MG/DL (ref 7–18)
BUN/CREAT SERPL: 11.9 (ref 10–20)
CALCIUM BLD-MCNC: 8.8 MG/DL (ref 8.5–10.1)
CHLORIDE SERPL-SCNC: 108 MMOL/L (ref 98–112)
CO2 SERPL-SCNC: 28 MMOL/L (ref 21–32)
CREAT BLD-MCNC: 0.67 MG/DL (ref 0.55–1.02)
DEPRECATED RDW RBC AUTO: 42.5 FL (ref 35.1–46.3)
EOSINOPHIL # BLD AUTO: 0.2 X10(3) UL (ref 0–0.7)
EOSINOPHIL NFR BLD AUTO: 3.1 %
ERYTHROCYTE [DISTWIDTH] IN BLOOD BY AUTOMATED COUNT: 13.2 % (ref 11–15)
GLOBULIN PLAS-MCNC: 4.1 G/DL (ref 2.8–4.4)
GLUCOSE BLD-MCNC: 100 MG/DL (ref 70–99)
HCT VFR BLD AUTO: 40.6 % (ref 35–48)
HGB BLD-MCNC: 13.4 G/DL (ref 12–16)
IMM GRANULOCYTES # BLD AUTO: 0.01 X10(3) UL (ref 0–1)
IMM GRANULOCYTES NFR BLD: 0.2 %
LYMPHOCYTES # BLD AUTO: 1.54 X10(3) UL (ref 1–4)
LYMPHOCYTES NFR BLD AUTO: 23.9 %
M PROTEIN MFR SERPL ELPH: 7.8 G/DL (ref 6.4–8.2)
MCH RBC QN AUTO: 29.3 PG (ref 26–34)
MCHC RBC AUTO-ENTMCNC: 33 G/DL (ref 31–37)
MCV RBC AUTO: 88.8 FL (ref 80–100)
MONOCYTES # BLD AUTO: 0.47 X10(3) UL (ref 0.1–1)
MONOCYTES NFR BLD AUTO: 7.3 %
NEUTROPHILS # BLD AUTO: 4.17 X10 (3) UL (ref 1.5–7.7)
NEUTROPHILS # BLD AUTO: 4.17 X10(3) UL (ref 1.5–7.7)
NEUTROPHILS NFR BLD AUTO: 64.7 %
OSMOLALITY SERPL CALC.SUM OF ELEC: 290 MOSM/KG (ref 275–295)
PATIENT FASTING: NO
PLATELET # BLD AUTO: 233 10(3)UL (ref 150–450)
POTASSIUM SERPL-SCNC: 3.9 MMOL/L (ref 3.5–5.1)
RBC # BLD AUTO: 4.57 X10(6)UL (ref 3.8–5.3)
SODIUM SERPL-SCNC: 141 MMOL/L (ref 136–145)
TSI SER-ACNC: 1.23 MIU/ML (ref 0.36–3.74)
WBC # BLD AUTO: 6.4 X10(3) UL (ref 4–11)

## 2019-09-26 PROCEDURE — 85025 COMPLETE CBC W/AUTO DIFF WBC: CPT

## 2019-09-26 PROCEDURE — 95909 NRV CNDJ TST 5-6 STUDIES: CPT

## 2019-09-26 PROCEDURE — 84443 ASSAY THYROID STIM HORMONE: CPT

## 2019-09-26 PROCEDURE — 95885 MUSC TST DONE W/NERV TST LIM: CPT

## 2019-09-26 PROCEDURE — 80053 COMPREHEN METABOLIC PANEL: CPT

## 2019-09-26 PROCEDURE — 36415 COLL VENOUS BLD VENIPUNCTURE: CPT

## 2019-09-30 ENCOUNTER — MED REC SCAN ONLY (OUTPATIENT)
Dept: INTERNAL MEDICINE CLINIC | Facility: CLINIC | Age: 39
End: 2019-09-30

## 2019-10-01 ENCOUNTER — PATIENT MESSAGE (OUTPATIENT)
Dept: INTERNAL MEDICINE CLINIC | Facility: CLINIC | Age: 39
End: 2019-10-01

## 2019-10-01 NOTE — TELEPHONE ENCOUNTER
From: Shraddha Peck  To: Mirna Vera MD  Sent: 10/1/2019 7:46 AM CDT  Subject: Test Results Question    I wanted to see if you got the results from the ecg test for my finger?

## 2019-10-01 NOTE — TELEPHONE ENCOUNTER
Dr. Kathi De Leon,    Please see [employer] message below    I believe I see the results of the EMG under the media tab.     Please advise and thank you

## 2019-11-04 ENCOUNTER — APPOINTMENT (OUTPATIENT)
Age: 39
Setting detail: DERMATOLOGY
End: 2019-11-04

## 2019-11-04 DIAGNOSIS — L21.8 OTHER SEBORRHEIC DERMATITIS: ICD-10-CM

## 2019-11-04 PROCEDURE — OTHER PRESCRIPTION: OTHER

## 2019-11-04 PROCEDURE — 99202 OFFICE O/P NEW SF 15 MIN: CPT

## 2019-11-04 PROCEDURE — OTHER COUNSELING: OTHER

## 2019-11-04 PROCEDURE — OTHER PRESCRIPTION MEDICATION MANAGEMENT: OTHER

## 2019-11-04 RX ORDER — KETOCONAZOLE 20.5 MG/ML
SHAMPOO, SUSPENSION TOPICAL QOD
Qty: 120 | Refills: 3 | Status: ERX | COMMUNITY
Start: 2019-11-04

## 2019-11-04 RX ORDER — CLOBETASOL PROPIONATE 0.46 MG/ML
SOLUTION TOPICAL BID
Qty: 50 | Refills: 0 | Status: ERX | COMMUNITY
Start: 2019-11-04

## 2019-11-04 ASSESSMENT — LOCATION ZONE DERM: LOCATION ZONE: SCALP

## 2019-11-04 ASSESSMENT — LOCATION DETAILED DESCRIPTION DERM
LOCATION DETAILED: RIGHT MEDIAL FRONTAL SCALP
LOCATION DETAILED: RIGHT CENTRAL PARIETAL SCALP
LOCATION DETAILED: MID-FRONTAL SCALP
LOCATION DETAILED: MID-OCCIPITAL SCALP
LOCATION DETAILED: LEFT CENTRAL PARIETAL SCALP

## 2019-11-04 ASSESSMENT — LOCATION SIMPLE DESCRIPTION DERM
LOCATION SIMPLE: POSTERIOR SCALP
LOCATION SIMPLE: ANTERIOR SCALP
LOCATION SIMPLE: RIGHT SCALP
LOCATION SIMPLE: SCALP

## 2019-11-04 NOTE — HPI: DRY SKIN
How Severe Is Your Dry Skin?: moderate
Additional History: Patient states she currently uses head and shoulders and the itching gets very bad to the point of bleeding.
How Many Showers Or Baths Do You Take In One Day?: every other day

## 2019-11-04 NOTE — PROCEDURE: PRESCRIPTION MEDICATION MANAGEMENT
Render In Strict Bullet Format?: No
Detail Level: Simple
Initiate Treatment: QOD Ketoconazole shampoo\\nBID clobetasol scalp solution

## 2019-11-29 ENCOUNTER — APPOINTMENT (OUTPATIENT)
Dept: LAB | Age: 39
End: 2019-11-29
Attending: INTERNAL MEDICINE
Payer: COMMERCIAL

## 2019-11-29 ENCOUNTER — OFFICE VISIT (OUTPATIENT)
Dept: INTERNAL MEDICINE CLINIC | Facility: CLINIC | Age: 39
End: 2019-11-29
Payer: COMMERCIAL

## 2019-11-29 VITALS
RESPIRATION RATE: 17 BRPM | TEMPERATURE: 98 F | HEIGHT: 64 IN | WEIGHT: 180 LBS | DIASTOLIC BLOOD PRESSURE: 70 MMHG | BODY MASS INDEX: 30.73 KG/M2 | HEART RATE: 96 BPM | SYSTOLIC BLOOD PRESSURE: 120 MMHG

## 2019-11-29 DIAGNOSIS — E55.9 VITAMIN D DEFICIENCY: ICD-10-CM

## 2019-11-29 DIAGNOSIS — R73.01 ELEVATED FASTING BLOOD SUGAR: ICD-10-CM

## 2019-11-29 DIAGNOSIS — G56.01 RIGHT CARPAL TUNNEL SYNDROME: ICD-10-CM

## 2019-11-29 DIAGNOSIS — Z00.00 PHYSICAL EXAM, ANNUAL: Primary | ICD-10-CM

## 2019-11-29 DIAGNOSIS — Z00.00 PHYSICAL EXAM, ANNUAL: ICD-10-CM

## 2019-11-29 PROCEDURE — 82306 VITAMIN D 25 HYDROXY: CPT

## 2019-11-29 PROCEDURE — 80061 LIPID PANEL: CPT

## 2019-11-29 PROCEDURE — 36415 COLL VENOUS BLD VENIPUNCTURE: CPT

## 2019-11-29 PROCEDURE — 99395 PREV VISIT EST AGE 18-39: CPT | Performed by: INTERNAL MEDICINE

## 2019-11-29 PROCEDURE — 83036 HEMOGLOBIN GLYCOSYLATED A1C: CPT

## 2019-11-29 NOTE — PATIENT INSTRUCTIONS
Prevention Guidelines, Women Ages 25 to 44  Screening tests and vaccines are an important part of managing your health. A screening test is done to find possible disorders or diseases in people who don't have any symptoms.  The goal is to find a disease e Type 2 diabetes, prediabetes All women diagnosed with gestational diabetes Lifelong testing every 3 years   Type 2 diabetes All women with prediabetes Every year   Gonorrhea Sexually active women at increased risk for infection At routine exams   Hepatitis Measles, mumps, rubella (MMR) All women in this age group who have no record of these infections or vaccines 1 or 2 doses   Meningococcal Women at increased risk for infection should talk with their healthcare provider 1 or more doses   Pneumococcal conjug © 4483-5256 [employer] [address] [place] [address], [place], [address] [place]. All rights reserved. This information is not intended as a substitute for professional medical care. Always follow your healthcare professional's instructions.

## 2019-11-29 NOTE — PROGRESS NOTES
Reilly Jones is a 42year old female. Patient presents with:  Physical      HPI:   Patient comes for physical--here with her son Angelo Chavez who is now 3years old  C/C physical exam  C/o cold symptoms  Review of systems as below    pmh  ?   Vitamin D deficiency normocephalic,ears and throat are clear, no frontal or maxillary sinus tenderness, pupils equal reactive to light bilaterally, extraocular muscles intact  NECK: supple,no adenopathy, nontender  LUNGS: clear to auscultation, no wheeze  CARDIO: RRR without m

## 2019-12-01 RX ORDER — ERGOCALCIFEROL 1.25 MG/1
50000 CAPSULE ORAL WEEKLY
Qty: 4 CAPSULE | Refills: 3 | Status: SHIPPED | OUTPATIENT
Start: 2019-12-01 | End: 2020-03-10

## 2019-12-26 ENCOUNTER — TELEPHONE (OUTPATIENT)
Dept: OBGYN CLINIC | Facility: CLINIC | Age: 39
End: 2019-12-26

## 2019-12-26 DIAGNOSIS — Z32.00 PREGNANCY EXAMINATION OR TEST, PREGNANCY UNCONFIRMED: Primary | ICD-10-CM

## 2019-12-26 NOTE — TELEPHONE ENCOUNTER
PT IS 3W, 1D TODAY. SHE DELIVERED WITH US IN 2017 SO CHOOSES TO DO OBN VIA PC APPT. BOOKED FOR 1-27-20. PT AWARE TO COME FOR SERUM HCG PRIOR TO OBN PC APPT. HAS ALREADY STARTED PNV'S. ENCOURAGED TO CALL BACK WITH ANY QUESTIONS OR CONCERNS.

## 2019-12-31 ENCOUNTER — PATIENT MESSAGE (OUTPATIENT)
Dept: OBGYN CLINIC | Facility: CLINIC | Age: 39
End: 2019-12-31

## 2020-01-14 ENCOUNTER — LAB ENCOUNTER (OUTPATIENT)
Dept: LAB | Facility: HOSPITAL | Age: 40
End: 2020-01-14
Attending: OBSTETRICS & GYNECOLOGY
Payer: COMMERCIAL

## 2020-01-14 DIAGNOSIS — Z32.00 PREGNANCY EXAMINATION OR TEST, PREGNANCY UNCONFIRMED: ICD-10-CM

## 2020-01-14 DIAGNOSIS — Z34.01 ENCOUNTER FOR SUPERVISION OF NORMAL FIRST PREGNANCY IN FIRST TRIMESTER: ICD-10-CM

## 2020-01-14 LAB — HCG SERPL QL: POSITIVE

## 2020-01-14 PROCEDURE — 36415 COLL VENOUS BLD VENIPUNCTURE: CPT

## 2020-01-14 PROCEDURE — 84702 CHORIONIC GONADOTROPIN TEST: CPT

## 2020-01-14 PROCEDURE — 84703 CHORIONIC GONADOTROPIN ASSAY: CPT

## 2020-01-15 ENCOUNTER — TELEPHONE (OUTPATIENT)
Dept: OBGYN CLINIC | Facility: CLINIC | Age: 40
End: 2020-01-15

## 2020-01-15 DIAGNOSIS — Z34.81 ENCOUNTER FOR SUPERVISION OF OTHER NORMAL PREGNANCY IN FIRST TRIMESTER: Primary | ICD-10-CM

## 2020-01-16 ENCOUNTER — TELEPHONE (OUTPATIENT)
Dept: OBGYN CLINIC | Facility: CLINIC | Age: 40
End: 2020-01-16

## 2020-01-16 DIAGNOSIS — O09.291 HISTORY OF MISCARRIAGE, CURRENTLY PREGNANT, FIRST TRIMESTER: Primary | ICD-10-CM

## 2020-01-16 LAB — B-HCG SERPL-ACNC: ABNORMAL MIU/ML

## 2020-01-16 NOTE — TELEPHONE ENCOUNTER
LMTCB. Pt needs to be informed that lab converting the quant on 1- into a quant and that she should repeat it today.

## 2020-01-16 NOTE — TELEPHONE ENCOUNTER
----- Message from Mare Nino DO sent at 1/15/2020  1:42 PM CST -----  pls notify of results. Pt had miscarriage with last pregnancy.   Im okay with checking quants and getting US at 6-7 weeks gestation

## 2020-01-16 NOTE — TELEPHONE ENCOUNTER
Informed pt that [address] state because she had a miscarriage with last preg, he ok with checking quants and getting an u/s at 6-7 weeks. Will see if lab and convert 1-14- qual into a quant and then repeat on 1-16 and then do an ob u/s.

## 2020-01-16 NOTE — TELEPHONE ENCOUNTER
----- Message from Torie Brown DO sent at 1/16/2020 11:26 AM CST -----  Raj Lagunas for US for viability; h/o miscarriage

## 2020-01-16 NOTE — TELEPHONE ENCOUNTER
Informed pt that lab is converting the 1- is converting the qual into a quant. Informed pt that she should go for quant today on 1- and call on 1/17/2020 for results.

## 2020-01-20 ENCOUNTER — NURSE ONLY (OUTPATIENT)
Dept: OBGYN CLINIC | Facility: CLINIC | Age: 40
End: 2020-01-20
Payer: COMMERCIAL

## 2020-01-20 VITALS — HEIGHT: 65 IN | BODY MASS INDEX: 30.82 KG/M2 | WEIGHT: 185 LBS

## 2020-01-20 DIAGNOSIS — Z34.81 ENCOUNTER FOR SUPERVISION OF OTHER NORMAL PREGNANCY IN FIRST TRIMESTER: Primary | ICD-10-CM

## 2020-01-20 NOTE — PROGRESS NOTES
Pt seen for OBN  PC appt today with no complaints. Normal PN labs ordered,  1hr gtt and Hep C. Pt advised all labs must be completed and resulted prior to MD appt. Pt transferred to [facility] to schedule an OBN MD appt. Pt has her OB U/S 1-. MCV less than 80:  No   Neural tube defect (Meningomyelocele, Spina bifida, or Anencephaly):  No   Congenital heart defect:  No   Down syndrome:  No   Guzman-Sachs (Ashkenazi Taoism, [facility]):  No   Canavan disease (Ashkenazi Taoism):  No   Fam

## 2020-01-22 ENCOUNTER — HOSPITAL ENCOUNTER (OUTPATIENT)
Dept: ULTRASOUND IMAGING | Age: 40
Discharge: HOME | End: 2020-01-22
Attending: OBSTETRICS & GYNECOLOGY
Payer: COMMERCIAL

## 2020-01-22 DIAGNOSIS — O09.291 HISTORY OF MISCARRIAGE, CURRENTLY PREGNANT, FIRST TRIMESTER: ICD-10-CM

## 2020-01-22 PROCEDURE — 76817 TRANSVAGINAL US OBSTETRIC: CPT | Performed by: OBSTETRICS & GYNECOLOGY

## 2020-01-22 PROCEDURE — 76801 OB US < 14 WKS SINGLE FETUS: CPT | Performed by: OBSTETRICS & GYNECOLOGY

## 2020-01-31 ENCOUNTER — LAB ENCOUNTER (OUTPATIENT)
Dept: LAB | Facility: HOSPITAL | Age: 40
End: 2020-01-31
Attending: OBSTETRICS & GYNECOLOGY
Payer: COMMERCIAL

## 2020-01-31 DIAGNOSIS — Z34.81 ENCOUNTER FOR SUPERVISION OF OTHER NORMAL PREGNANCY IN FIRST TRIMESTER: ICD-10-CM

## 2020-01-31 LAB
ANTIBODY SCREEN: NEGATIVE
BASOPHILS # BLD AUTO: 0.04 X10(3) UL (ref 0–0.2)
BASOPHILS NFR BLD AUTO: 0.5 %
DEPRECATED RDW RBC AUTO: 40.9 FL (ref 35.1–46.3)
EOSINOPHIL # BLD AUTO: 0.04 X10(3) UL (ref 0–0.7)
EOSINOPHIL NFR BLD AUTO: 0.5 %
ERYTHROCYTE [DISTWIDTH] IN BLOOD BY AUTOMATED COUNT: 12.5 % (ref 11–15)
GLUCOSE 1H P GLC SERPL-MCNC: 153 MG/DL
HBV SURFACE AG SER-ACNC: <0.1 [IU]/L
HBV SURFACE AG SERPL QL IA: NONREACTIVE
HCT VFR BLD AUTO: 38.3 % (ref 35–48)
HCV AB SERPL QL IA: NONREACTIVE
HGB BLD-MCNC: 13.1 G/DL (ref 12–16)
IMM GRANULOCYTES # BLD AUTO: 0.03 X10(3) UL (ref 0–1)
IMM GRANULOCYTES NFR BLD: 0.3 %
LYMPHOCYTES # BLD AUTO: 1.36 X10(3) UL (ref 1–4)
LYMPHOCYTES NFR BLD AUTO: 15.4 %
MCH RBC QN AUTO: 30.5 PG (ref 26–34)
MCHC RBC AUTO-ENTMCNC: 34.2 G/DL (ref 31–37)
MCV RBC AUTO: 89.3 FL (ref 80–100)
MONOCYTES # BLD AUTO: 0.37 X10(3) UL (ref 0.1–1)
MONOCYTES NFR BLD AUTO: 4.2 %
NEUTROPHILS # BLD AUTO: 7 X10 (3) UL (ref 1.5–7.7)
NEUTROPHILS # BLD AUTO: 7 X10(3) UL (ref 1.5–7.7)
NEUTROPHILS NFR BLD AUTO: 79.1 %
PLATELET # BLD AUTO: 216 10(3)UL (ref 150–450)
RBC # BLD AUTO: 4.29 X10(6)UL (ref 3.8–5.3)
RH BLOOD TYPE: POSITIVE
RUBV IGG SER QL: POSITIVE
RUBV IGG SER-ACNC: 22.4 IU/ML (ref 10–?)
T PALLIDUM AB SER QL: NEGATIVE
WBC # BLD AUTO: 8.8 X10(3) UL (ref 4–11)

## 2020-01-31 PROCEDURE — 86803 HEPATITIS C AB TEST: CPT

## 2020-01-31 PROCEDURE — 87086 URINE CULTURE/COLONY COUNT: CPT

## 2020-01-31 PROCEDURE — 85025 COMPLETE CBC W/AUTO DIFF WBC: CPT

## 2020-01-31 PROCEDURE — 82950 GLUCOSE TEST: CPT

## 2020-01-31 PROCEDURE — 36415 COLL VENOUS BLD VENIPUNCTURE: CPT

## 2020-01-31 PROCEDURE — 86780 TREPONEMA PALLIDUM: CPT

## 2020-01-31 PROCEDURE — 86900 BLOOD TYPING SEROLOGIC ABO: CPT

## 2020-01-31 PROCEDURE — 86901 BLOOD TYPING SEROLOGIC RH(D): CPT

## 2020-01-31 PROCEDURE — 86850 RBC ANTIBODY SCREEN: CPT

## 2020-01-31 PROCEDURE — 87340 HEPATITIS B SURFACE AG IA: CPT

## 2020-01-31 PROCEDURE — 87389 HIV-1 AG W/HIV-1&-2 AB AG IA: CPT

## 2020-01-31 PROCEDURE — 86762 RUBELLA ANTIBODY: CPT

## 2020-02-03 ENCOUNTER — PATIENT MESSAGE (OUTPATIENT)
Dept: OBGYN CLINIC | Facility: CLINIC | Age: 40
End: 2020-02-03

## 2020-02-03 DIAGNOSIS — R73.09 ELEVATED GLUCOSE: Primary | ICD-10-CM

## 2020-02-03 NOTE — TELEPHONE ENCOUNTER
From: Primo Bach  To: Adelaida Buck MD  Sent: 2/3/2020 12:30 PM CST  Subject: Test Results Question    I took my glucose test Friday, do you know how long it will be till I get the results?  Thank you

## 2020-02-10 ENCOUNTER — INITIAL PRENATAL (OUTPATIENT)
Dept: OBGYN CLINIC | Facility: CLINIC | Age: 40
End: 2020-02-10
Payer: COMMERCIAL

## 2020-02-10 ENCOUNTER — TELEPHONE (OUTPATIENT)
Dept: OBGYN CLINIC | Facility: CLINIC | Age: 40
End: 2020-02-10

## 2020-02-10 VITALS
BODY MASS INDEX: 31 KG/M2 | HEART RATE: 85 BPM | SYSTOLIC BLOOD PRESSURE: 122 MMHG | WEIGHT: 185 LBS | DIASTOLIC BLOOD PRESSURE: 73 MMHG

## 2020-02-10 DIAGNOSIS — Z34.81 ENCOUNTER FOR SUPERVISION OF OTHER NORMAL PREGNANCY IN FIRST TRIMESTER: Primary | ICD-10-CM

## 2020-02-10 DIAGNOSIS — O09.521 AMA (ADVANCED MATERNAL AGE) MULTIGRAVIDA 35+, FIRST TRIMESTER: Primary | ICD-10-CM

## 2020-02-10 LAB
MULTISTIX LOT#: NORMAL NUMERIC
URINE-COLOR: YELLOW

## 2020-02-10 PROCEDURE — 81002 URINALYSIS NONAUTO W/O SCOPE: CPT | Performed by: OBSTETRICS & GYNECOLOGY

## 2020-02-11 LAB
C TRACH DNA SPEC QL NAA+PROBE: NEGATIVE
HPV I/H RISK 1 DNA SPEC QL NAA+PROBE: NEGATIVE
N GONORRHOEA DNA SPEC QL NAA+PROBE: NEGATIVE

## 2020-02-12 LAB — T VAGINALIS RRNA SPEC QL NAA+PROBE: NEGATIVE

## 2020-02-16 PROBLEM — O09.521 MULTIGRAVIDA OF ADVANCED MATERNAL AGE IN FIRST TRIMESTER: Status: ACTIVE | Noted: 2020-02-16

## 2020-02-23 ENCOUNTER — LABORATORY ENCOUNTER (OUTPATIENT)
Dept: LAB | Facility: HOSPITAL | Age: 40
End: 2020-02-23
Attending: OBSTETRICS & GYNECOLOGY
Payer: COMMERCIAL

## 2020-02-23 DIAGNOSIS — R73.09 ELEVATED GLUCOSE: ICD-10-CM

## 2020-02-23 LAB
1 HR GLUCOSE GESTATIONAL: 159 MG/DL
GLUCOSE 1H P GLC SERPL-MCNC: 89 MG/DL
GLUCOSE 3H P GLC SERPL-MCNC: 96 MG/DL
GLUCOSE P FAST SERPL-MCNC: 91 MG/DL

## 2020-02-23 PROCEDURE — 36415 COLL VENOUS BLD VENIPUNCTURE: CPT

## 2020-02-23 PROCEDURE — 82952 GTT-ADDED SAMPLES: CPT

## 2020-02-23 PROCEDURE — 82951 GLUCOSE TOLERANCE TEST (GTT): CPT

## 2020-02-23 NOTE — PROGRESS NOTES
Outpatient Maternal-Fetal Medicine Consultation    Dear Dr. Sarah Cooper    Thank you for requesting ultrasound evaluation and maternal fetal medicine consultation on your patient Deisy Baez.   As you are aware she is a 47year old female  with a banks p She indicated that her father is alive. She indicated that her maternal grandmother is . She indicated that her maternal grandfather is .       Medications:   Current Outpatient Medications:   •  ergocalciferol 1.25 MG (90651 UT) Oral Cap, T alterations in obstetric care are advised. Fetal Aneuploidy    We also discussed the increased risk of chromosomal abnormalities associated with advanced maternal age.   I reviewed that an ultrasound examination cannot reliably exclude potential genetic questions or concerns arise. Juan Carlos Jason. Akin Tirado M.D. The majority of the time (>50%) was spent in review of records, consultation and coordination of care. Our discussion is summarized above.  The approximate physician face-to-face time was 30 minute

## 2020-02-24 ENCOUNTER — PATIENT MESSAGE (OUTPATIENT)
Dept: OBGYN CLINIC | Facility: CLINIC | Age: 40
End: 2020-02-24

## 2020-02-24 DIAGNOSIS — Z36.9 FIRST TRIMESTER SCREENING: Primary | ICD-10-CM

## 2020-02-24 DIAGNOSIS — O09.521 ADVANCED MATERNAL AGE IN MULTIGRAVIDA, FIRST TRIMESTER: ICD-10-CM

## 2020-02-24 NOTE — TELEPHONE ENCOUNTER
From: Kyleigh Guerrier  To: Jack Espitia MD  Sent: 2/24/2020 11:21 AM CST  Subject: Referral Request    The order you just sent is that the same as the genetic testing?  Because I already have an appointment for that, but I remember the doctor saying somethin

## 2020-02-24 NOTE — TELEPHONE ENCOUNTER
From: [place] Loss  To: Yohana Morgan MD  Sent: 2/24/2020 7:42 AM CST  Subject: Test Results Question    Dr. Jane Bowles told me to call or message for my glucose test results today. Also I never received a call for an appointment with the high risk doctors.  She

## 2020-02-25 ENCOUNTER — HOSPITAL ENCOUNTER (OUTPATIENT)
Dept: PERINATAL CARE | Facility: HOSPITAL | Age: 40
Discharge: HOME | End: 2020-02-25
Attending: OBSTETRICS & GYNECOLOGY
Payer: COMMERCIAL

## 2020-02-25 VITALS
DIASTOLIC BLOOD PRESSURE: 75 MMHG | BODY MASS INDEX: 31 KG/M2 | SYSTOLIC BLOOD PRESSURE: 117 MMHG | WEIGHT: 185 LBS | HEART RATE: 61 BPM

## 2020-02-25 DIAGNOSIS — O09.521 AMA (ADVANCED MATERNAL AGE) MULTIGRAVIDA 35+, FIRST TRIMESTER: ICD-10-CM

## 2020-02-25 DIAGNOSIS — Z36.9 FIRST TRIMESTER SCREENING: ICD-10-CM

## 2020-02-25 DIAGNOSIS — Z36.9 FIRST TRIMESTER SCREENING: Primary | ICD-10-CM

## 2020-02-25 PROCEDURE — 76813 OB US NUCHAL MEAS 1 GEST: CPT | Performed by: OBSTETRICS & GYNECOLOGY

## 2020-02-25 PROCEDURE — 99242 OFF/OP CONSLTJ NEW/EST SF 20: CPT | Performed by: OBSTETRICS & GYNECOLOGY

## 2020-02-25 RX ORDER — PRENATAL VIT/IRON FUM/FOLIC AC 27MG-0.8MG
1 TABLET ORAL DAILY
COMMUNITY
End: 2020-10-06

## 2020-03-03 ENCOUNTER — TELEPHONE (OUTPATIENT)
Dept: PERINATAL CARE | Facility: HOSPITAL | Age: 40
End: 2020-03-03

## 2020-03-03 NOTE — TELEPHONE ENCOUNTER
HARMONY screening results obt  Reviewed by MD gabriel  Low risk for  Trisomy 21  1:15413 risk  Low risk for Trisomy 18/13  1:55484 risk    Fetal sex: boy    Pt states understanding  Copy of results sent for scanning into pt record

## 2020-03-04 ENCOUNTER — TELEPHONE (OUTPATIENT)
Dept: OBGYN CLINIC | Facility: CLINIC | Age: 40
End: 2020-03-04

## 2020-03-09 ENCOUNTER — PATIENT MESSAGE (OUTPATIENT)
Dept: OBGYN CLINIC | Facility: CLINIC | Age: 40
End: 2020-03-09

## 2020-03-09 NOTE — TELEPHONE ENCOUNTER
From: Bianka Fernandez  To: Mayte Easton DO  Sent: 3/9/2020 8:17 AM CDT  Subject: Other    I have a really bad headache I know im allowed tylenol how often and how many mg? I'm also almost 14 weeks pregnant, I've had this headache since yesterday.

## 2020-03-09 NOTE — TELEPHONE ENCOUNTER
Pt states that she started drinking more water and she feels better. Denies hx of hypertension, hx of headache, hx of migraines, right upper gastric pain, dizziness, visual changes and fever. Informed pt to call us if she has any of those symptoms.   Pt

## 2020-03-10 ENCOUNTER — ROUTINE PRENATAL (OUTPATIENT)
Dept: OBGYN CLINIC | Facility: CLINIC | Age: 40
End: 2020-03-10
Payer: COMMERCIAL

## 2020-03-10 VITALS
DIASTOLIC BLOOD PRESSURE: 78 MMHG | SYSTOLIC BLOOD PRESSURE: 109 MMHG | BODY MASS INDEX: 30 KG/M2 | WEIGHT: 182.19 LBS | HEART RATE: 75 BPM

## 2020-03-10 DIAGNOSIS — Z34.91 ENCOUNTER FOR SUPERVISION OF NORMAL PREGNANCY IN FIRST TRIMESTER, UNSPECIFIED GRAVIDITY: Primary | ICD-10-CM

## 2020-03-10 LAB
APPEARANCE: CLEAR
MULTISTIX LOT#: NORMAL NUMERIC
PH, URINE: 6.5 (ref 4.5–8)
SPECIFIC GRAVITY: 1.01 (ref 1–1.03)
URINE-COLOR: YELLOW
UROBILINOGEN,SEMI-QN: 0.2 MG/DL (ref 0–1.9)

## 2020-03-10 PROCEDURE — 81002 URINALYSIS NONAUTO W/O SCOPE: CPT | Performed by: OBSTETRICS & GYNECOLOGY

## 2020-03-13 ENCOUNTER — PATIENT MESSAGE (OUTPATIENT)
Dept: OBGYN CLINIC | Facility: CLINIC | Age: 40
End: 2020-03-13

## 2020-03-13 NOTE — TELEPHONE ENCOUNTER
From: Janet Carlson  To: Esthela Baxter MD  Sent: 3/13/2020 2:48 PM CDT  Subject: Prescription Question    I have a stuffy nose and congestion , I'm I able to take this since I'm 14 weeks pregnant?

## 2020-03-26 ENCOUNTER — PATIENT MESSAGE (OUTPATIENT)
Dept: OBGYN CLINIC | Facility: CLINIC | Age: 40
End: 2020-03-26

## 2020-03-26 NOTE — TELEPHONE ENCOUNTER
From: Felisha Rivera  To: Rommel Talley MD  Sent: 3/26/2020 5:38 PM CDT  Subject: Other    I have an appt in april 7th will this appt still be in place with everything that's happening?

## 2020-04-06 ENCOUNTER — PATIENT MESSAGE (OUTPATIENT)
Dept: OBGYN CLINIC | Facility: CLINIC | Age: 40
End: 2020-04-06

## 2020-04-06 ENCOUNTER — TELEPHONE (OUTPATIENT)
Dept: OBGYN CLINIC | Facility: CLINIC | Age: 40
End: 2020-04-06

## 2020-04-06 NOTE — TELEPHONE ENCOUNTER
Given symptoms would recommend phone visit to avoid exposure risk.   Make sure MD speaking with her is aware of this

## 2020-04-06 NOTE — TELEPHONE ENCOUNTER
Patient has an appt tomorrow morning for her prenatal visit. I called patient to do a travel screening questionnaire. Patient stated she had a 101 degree temperature from a headache she had.  I advised pt that I would forward a message to the nurses before

## 2020-04-06 NOTE — TELEPHONE ENCOUNTER
From: Fabián Valentine  To: Kalie Samayoa MD  Sent: 4/6/2020 2:49 PM CDT  Subject: Other    I still haven't received a call back on weather I will be able to keep my in person appt tomorrow at 820am? If the doctor says I cant come in to the office I would lik

## 2020-04-06 NOTE — TELEPHONE ENCOUNTER
Informed pt of BO recs below. Pt states no one has entered of left home for 4wks. Pt states groceries are delivered bu [employer] left outside for 24hrs and then  wipes down groceries.  Informed pt it is best for tomorrow appts to be PC appt to discu

## 2020-04-06 NOTE — TELEPHONE ENCOUNTER
SCHEDULED AT 8:20AM TOMORROW WITH SHAYY. PT STATES SHE HAS BEEN IN THE HOUSE FOR 3 WEEKS. HAD A HEADACHE THAT BEGAN ON Saturday, USED ES TYLENOL WHICH IMPROVED HEADACHE. HEADACHE AGAIN YESTERDAY AND FEVER .3 LAST NIGHT.   TOOK TYLENOL AGAIN AND HEAD

## 2020-04-06 NOTE — TELEPHONE ENCOUNTER
Fevers and headaches are covid signs. Due to safety of our pateints and clinicians, would advise 2 weeks; she can do phone visit with MD tomorrow and then can further discuss her symtpoms with her to deem if can be seen sooner.   Does her  or child

## 2020-04-06 NOTE — TELEPHONE ENCOUNTER
Notified pt of [address] recs. Pt wants to reschedule PN appt so that she can come in. Pt asking when can she come to clinic for appt? Advised pt the doctor did not state when pt can come in. Advised due to Covid 19 precautions are being taken.  States she has no

## 2020-04-07 ENCOUNTER — VIRTUAL PHONE E/M (OUTPATIENT)
Dept: OBGYN CLINIC | Facility: CLINIC | Age: 40
End: 2020-04-07
Payer: COMMERCIAL

## 2020-04-07 ENCOUNTER — PATIENT MESSAGE (OUTPATIENT)
Dept: OBGYN CLINIC | Facility: CLINIC | Age: 40
End: 2020-04-07

## 2020-04-07 DIAGNOSIS — Z34.82 ENCOUNTER FOR SUPERVISION OF OTHER NORMAL PREGNANCY IN SECOND TRIMESTER: Primary | ICD-10-CM

## 2020-04-07 NOTE — TELEPHONE ENCOUNTER
From: Sylvia Sterling  To: Hermes Torres. MD Marie  Sent: 4/7/2020 8:25 AM CDT  Subject: Other    I had a phone appt with dr Daniel Alvarado at 8am this morning, its 825 and I still haven't received a call. Is she still going to call me?  Thanks

## 2020-04-16 ENCOUNTER — PATIENT MESSAGE (OUTPATIENT)
Dept: INTERNAL MEDICINE CLINIC | Facility: CLINIC | Age: 40
End: 2020-04-16

## 2020-04-16 DIAGNOSIS — Z86.32 HISTORY OF GESTATIONAL DIABETES: Primary | ICD-10-CM

## 2020-04-16 RX ORDER — LANCETS 28 GAUGE
1 EACH MISCELLANEOUS DAILY
Qty: 100 EACH | Refills: 3 | Status: SHIPPED | OUTPATIENT
Start: 2020-04-16 | End: 2020-10-06

## 2020-04-16 RX ORDER — BLOOD-GLUCOSE METER
1 KIT MISCELLANEOUS DAILY
Qty: 100 STRIP | Refills: 3 | Status: ON HOLD | OUTPATIENT
Start: 2020-04-16 | End: 2020-08-27

## 2020-04-16 RX ORDER — BLOOD-GLUCOSE METER
1 KIT MISCELLANEOUS AS NEEDED
Qty: 1 KIT | Refills: 0 | Status: SHIPPED | OUTPATIENT
Start: 2020-04-16 | End: 2020-10-06

## 2020-04-16 NOTE — TELEPHONE ENCOUNTER
Dr Burger=see below, per our record =patient has normal 1 ,2 and 3 hr glucose test.Do you want to order the machine or tell patient to contact the OB [facility] for the order? From: Primo Bach  To:  Emely Fowler MD  Sent: 4/16/2020 12:07 PM CDT  Subject: Pre

## 2020-04-16 NOTE — TELEPHONE ENCOUNTER
can fwd to gyn dept if not covered  --noted she recently had TE with dr blackman   Noted her 3-hour glucose tolerance test was normal, not sure if the glucometer will be approved if there is a history of gestational diabetes only and not currently--I can try p

## 2020-04-16 NOTE — TELEPHONE ENCOUNTER
Judi message sent. From: Margaret Potter  To: Nilda Colón MD  Sent: 4/16/2020  2:29 PM CDT  Subject: Prescription Question    Could you please let me know once the prescription for the glucose machine has been sent to [facility].  I have one that was

## 2020-04-22 ENCOUNTER — HOSPITAL ENCOUNTER (OUTPATIENT)
Dept: PERINATAL CARE | Facility: HOSPITAL | Age: 40
Discharge: HOME | End: 2020-04-22
Attending: OBSTETRICS & GYNECOLOGY
Payer: COMMERCIAL

## 2020-04-22 VITALS
DIASTOLIC BLOOD PRESSURE: 83 MMHG | SYSTOLIC BLOOD PRESSURE: 118 MMHG | HEART RATE: 92 BPM | BODY MASS INDEX: 31 KG/M2 | WEIGHT: 188 LBS

## 2020-04-22 DIAGNOSIS — O09.521 MULTIGRAVIDA OF ADVANCED MATERNAL AGE IN FIRST TRIMESTER: ICD-10-CM

## 2020-04-22 DIAGNOSIS — O09.521 MULTIGRAVIDA OF ADVANCED MATERNAL AGE IN FIRST TRIMESTER: Primary | ICD-10-CM

## 2020-04-22 PROCEDURE — 76811 OB US DETAILED SNGL FETUS: CPT | Performed by: OBSTETRICS & GYNECOLOGY

## 2020-04-22 PROCEDURE — 99214 OFFICE O/P EST MOD 30 MIN: CPT | Performed by: OBSTETRICS & GYNECOLOGY

## 2020-04-22 NOTE — PROGRESS NOTES
Outpatient Maternal-Fetal Medicine Consultation    Dear Dr. Daniela Marin    Thank you for requesting ultrasound evaluation and maternal fetal medicine consultation on your patient Felisha Rivera.   As you are aware she is a 47year old female  with a banks p She indicated that her father is alive. She indicated that her maternal grandmother is . She indicated that her maternal grandfather is .       Medications:   Current Outpatient Medications:   •  FreeStyle System Does not apply Kit, 1 each b cavum septi pellucidi. Face: eyes normal, profile normal, nose normal, lip normal, palate normal.  Heart: visualized and normal appearance: 3 vessel view, four-chamber, left outflow tract, right outflow tract, arches.       Genetic Sonogram:  Nuchal fold n today, declined invasive testing    RECOMMENDATIONS:  · Continue care with Dr. Abdoulaye Worthington ultrasound at 32 weeks. Weekly NST's at 36 weeks. Thank you for allowing me to participate in the care of your patient.   Please do not hesitate to cont

## 2020-05-04 ENCOUNTER — PATIENT MESSAGE (OUTPATIENT)
Dept: INTERNAL MEDICINE CLINIC | Facility: CLINIC | Age: 40
End: 2020-05-04

## 2020-05-04 RX ORDER — OLOPATADINE HYDROCHLORIDE 1 MG/ML
1 SOLUTION/ DROPS OPHTHALMIC 2 TIMES DAILY
Qty: 1 BOTTLE | Refills: 1 | Status: SHIPPED | OUTPATIENT
Start: 2020-05-04 | End: 2020-08-19

## 2020-05-04 NOTE — TELEPHONE ENCOUNTER
From: Yessica Hoffman  To: Tim Giordano MD  Sent: 5/4/2020 12:11 PM CDT  Subject: Prescription Question    I was wondering if I could get a prescription for patanol, My allergies are starting to act up.  Thank you

## 2020-05-09 ENCOUNTER — ROUTINE PRENATAL (OUTPATIENT)
Dept: OBGYN CLINIC | Facility: CLINIC | Age: 40
End: 2020-05-09
Payer: COMMERCIAL

## 2020-05-09 VITALS
BODY MASS INDEX: 32 KG/M2 | WEIGHT: 189.38 LBS | DIASTOLIC BLOOD PRESSURE: 65 MMHG | HEART RATE: 92 BPM | SYSTOLIC BLOOD PRESSURE: 98 MMHG

## 2020-05-09 DIAGNOSIS — Z34.92 ENCOUNTER FOR SUPERVISION OF NORMAL PREGNANCY IN SECOND TRIMESTER, UNSPECIFIED GRAVIDITY: Primary | ICD-10-CM

## 2020-05-09 PROCEDURE — 81002 URINALYSIS NONAUTO W/O SCOPE: CPT | Performed by: OBSTETRICS & GYNECOLOGY

## 2020-05-09 RX ORDER — ERGOCALCIFEROL 1.25 MG/1
50000 CAPSULE ORAL WEEKLY
COMMUNITY
Start: 2020-04-16 | End: 2020-08-19

## 2020-05-09 NOTE — PROGRESS NOTES
Reviewed normal Level 2 structure though it was 12 days ahead. She will have 32 week growth. Plan 26 week phone and 28 week office with same day lab draw.

## 2020-05-18 ENCOUNTER — PATIENT MESSAGE (OUTPATIENT)
Dept: OBGYN CLINIC | Facility: CLINIC | Age: 40
End: 2020-05-18

## 2020-05-18 DIAGNOSIS — Z34.92 ENCOUNTER FOR SUPERVISION OF NORMAL PREGNANCY IN SECOND TRIMESTER, UNSPECIFIED GRAVIDITY: Primary | ICD-10-CM

## 2020-05-18 NOTE — TELEPHONE ENCOUNTER
From: Deisy Baez  To: Ivan Jimenes. DO Inocencio  Sent: 5/18/2020 2:25 PM CDT  Subject: Other    I wanted to see if dr. Artemio Halsted sent the order for my glucose test at 28 weeks that he had mentioned ?  Thank you

## 2020-05-23 ENCOUNTER — PATIENT MESSAGE (OUTPATIENT)
Dept: OBGYN CLINIC | Facility: CLINIC | Age: 40
End: 2020-05-23

## 2020-05-23 NOTE — TELEPHONE ENCOUNTER
From: Lm Sosa  To: Brenton Dupree. DO Inocencio  Sent: 5/23/2020 2:51 AM CDT  Subject: Other    I'm having mad pains in my legs , when my  massages them he says he feels knots could that be blood Clots because of the pregnancy?  I never had this with my

## 2020-05-23 NOTE — TELEPHONE ENCOUNTER
CALLED PT AND STATES HER  COULD FEEL SOME LUMPS IN HER LEGS WHILE MASSAGING THEM LAST NIGHT. STATES THEY WENT FOR A WALK YESTERDAY, FIRST TIME IN A COUPLE MONTHS AND WONDERS IF THAT COULD BE THE REASON.   HAS BEEN WORKING FROM HOME FOR 2 MONTHS BUT

## 2020-06-05 ENCOUNTER — TELEPHONE (OUTPATIENT)
Dept: OBGYN CLINIC | Facility: CLINIC | Age: 40
End: 2020-06-05

## 2020-06-05 NOTE — TELEPHONE ENCOUNTER
26w2d wanted to confirm if PN was a phone visit. Informed PN is an in- person visit. States understanding.

## 2020-06-06 ENCOUNTER — APPOINTMENT (OUTPATIENT)
Dept: LAB | Facility: HOSPITAL | Age: 40
End: 2020-06-06
Attending: OBSTETRICS & GYNECOLOGY
Payer: COMMERCIAL

## 2020-06-06 ENCOUNTER — ROUTINE PRENATAL (OUTPATIENT)
Dept: OBGYN CLINIC | Facility: CLINIC | Age: 40
End: 2020-06-06
Payer: COMMERCIAL

## 2020-06-06 VITALS
BODY MASS INDEX: 32 KG/M2 | HEART RATE: 109 BPM | WEIGHT: 194.81 LBS | SYSTOLIC BLOOD PRESSURE: 126 MMHG | DIASTOLIC BLOOD PRESSURE: 81 MMHG

## 2020-06-06 DIAGNOSIS — Z34.92 ENCOUNTER FOR SUPERVISION OF NORMAL PREGNANCY IN SECOND TRIMESTER, UNSPECIFIED GRAVIDITY: Primary | ICD-10-CM

## 2020-06-06 DIAGNOSIS — Z34.92 ENCOUNTER FOR SUPERVISION OF NORMAL PREGNANCY IN SECOND TRIMESTER, UNSPECIFIED GRAVIDITY: ICD-10-CM

## 2020-06-06 PROCEDURE — 81002 URINALYSIS NONAUTO W/O SCOPE: CPT | Performed by: OBSTETRICS & GYNECOLOGY

## 2020-06-06 PROCEDURE — 85027 COMPLETE CBC AUTOMATED: CPT

## 2020-06-06 PROCEDURE — 82950 GLUCOSE TEST: CPT

## 2020-06-06 PROCEDURE — 36415 COLL VENOUS BLD VENIPUNCTURE: CPT

## 2020-06-06 RX ORDER — KETOCONAZOLE 20 MG/ML
SHAMPOO TOPICAL
COMMUNITY
Start: 2020-05-28 | End: 2020-08-19

## 2020-06-15 ENCOUNTER — LAB ENCOUNTER (OUTPATIENT)
Dept: LAB | Facility: REFERENCE LAB | Age: 40
End: 2020-06-15
Attending: OBSTETRICS & GYNECOLOGY
Payer: COMMERCIAL

## 2020-06-15 DIAGNOSIS — R73.09 ELEVATED GLUCOSE TOLERANCE TEST: ICD-10-CM

## 2020-06-15 PROCEDURE — 82952 GTT-ADDED SAMPLES: CPT

## 2020-06-15 PROCEDURE — 82951 GLUCOSE TOLERANCE TEST (GTT): CPT

## 2020-06-15 PROCEDURE — 36415 COLL VENOUS BLD VENIPUNCTURE: CPT

## 2020-06-19 ENCOUNTER — ROUTINE PRENATAL (OUTPATIENT)
Dept: OBGYN CLINIC | Facility: CLINIC | Age: 40
End: 2020-06-19
Payer: COMMERCIAL

## 2020-06-19 VITALS
HEART RATE: 86 BPM | WEIGHT: 195.63 LBS | BODY MASS INDEX: 33 KG/M2 | SYSTOLIC BLOOD PRESSURE: 106 MMHG | DIASTOLIC BLOOD PRESSURE: 68 MMHG

## 2020-06-19 DIAGNOSIS — Z34.93 ENCOUNTER FOR SUPERVISION OF NORMAL PREGNANCY IN THIRD TRIMESTER, UNSPECIFIED GRAVIDITY: Primary | ICD-10-CM

## 2020-06-19 DIAGNOSIS — N94.9 VAGINAL BURNING: ICD-10-CM

## 2020-06-19 PROCEDURE — 90715 TDAP VACCINE 7 YRS/> IM: CPT | Performed by: OBSTETRICS & GYNECOLOGY

## 2020-06-19 PROCEDURE — 90471 IMMUNIZATION ADMIN: CPT | Performed by: OBSTETRICS & GYNECOLOGY

## 2020-06-19 PROCEDURE — 81002 URINALYSIS NONAUTO W/O SCOPE: CPT | Performed by: OBSTETRICS & GYNECOLOGY

## 2020-06-19 NOTE — PROGRESS NOTES
C/o vaginal burning- vaginal culture. Reviewed kick counts. Diabetic Ed on 6/23/2020. TDAP today.    RTC 2 wks

## 2020-06-19 NOTE — PROGRESS NOTES
TDAP INFO SHEET GIVEN AND TDAP CONSENT SIGNED. PT TOLERATED INJECTION WITHOUT INCIDENT AND HAD NO QUESTIONS AT THIS TIME. ENCOURAGED TO CALL BACK WITH ANY QUESTIONS OR CONCERNS.

## 2020-06-21 ENCOUNTER — PATIENT MESSAGE (OUTPATIENT)
Dept: OBGYN CLINIC | Facility: CLINIC | Age: 40
End: 2020-06-21

## 2020-06-22 NOTE — TELEPHONE ENCOUNTER
Message to [id number] [facility] [address] for [address]

## 2020-06-23 ENCOUNTER — HOSPITAL ENCOUNTER (OUTPATIENT)
Dept: ENDOCRINOLOGY | Facility: HOSPITAL | Age: 40
Discharge: HOME | End: 2020-06-23
Attending: OBSTETRICS & GYNECOLOGY
Payer: COMMERCIAL

## 2020-06-23 ENCOUNTER — APPOINTMENT (OUTPATIENT)
Age: 40
Setting detail: DERMATOLOGY
End: 2020-06-23

## 2020-06-23 DIAGNOSIS — O24.410 DIET CONTROLLED GESTATIONAL DIABETES MELLITUS (GDM) IN THIRD TRIMESTER: Primary | ICD-10-CM

## 2020-06-23 RX ORDER — BLOOD-GLUCOSE METER
1 EACH MISCELLANEOUS 4 TIMES DAILY
Qty: 1 KIT | Refills: 0 | Status: SHIPPED | OUTPATIENT
Start: 2020-06-23 | End: 2020-10-06

## 2020-06-23 RX ORDER — LANCETS 33 GAUGE
1 EACH MISCELLANEOUS 4 TIMES DAILY
Qty: 1 BOX | Refills: 3 | Status: SHIPPED | OUTPATIENT
Start: 2020-06-23 | End: 2020-10-06

## 2020-06-23 RX ORDER — FLUCONAZOLE 150 MG/1
150 TABLET ORAL SEE ADMIN INSTRUCTIONS
Qty: 2 TABLET | Refills: 0 | Status: SHIPPED | OUTPATIENT
Start: 2020-06-23 | End: 2020-08-19

## 2020-06-23 RX ORDER — BLOOD SUGAR DIAGNOSTIC
1 STRIP MISCELLANEOUS 4 TIMES DAILY
Qty: 100 STRIP | Refills: 3 | Status: ON HOLD | OUTPATIENT
Start: 2020-06-23 | End: 2020-08-27

## 2020-06-23 NOTE — PROGRESS NOTES
Yasir Breath  : 1980 was seen for Gestational Diabetes Counseling: Individual  Pt had GDM with first baby ~ 3 years ago, did need insulin.     Date: 2020   Start time: 10 am End time: 11 am    Obtained usual diet history: Pt typically eats 3 elizabeth MD office visit. 4. Encouraged activity if no restrictions. 5. Encouraged Debi Blank to call diabetes center with any questions or concerns.    6. Eliminate sugared beverages and replace with water/sparkling water    Patient verbalized understanding and has

## 2020-06-24 ENCOUNTER — APPOINTMENT (OUTPATIENT)
Dept: ENDOCRINOLOGY | Facility: HOSPITAL | Age: 40
End: 2020-06-24
Attending: OBSTETRICS & GYNECOLOGY
Payer: COMMERCIAL

## 2020-07-01 ENCOUNTER — PATIENT MESSAGE (OUTPATIENT)
Dept: OBGYN CLINIC | Facility: CLINIC | Age: 40
End: 2020-07-01

## 2020-07-01 ENCOUNTER — ROUTINE PRENATAL (OUTPATIENT)
Dept: OBGYN CLINIC | Facility: CLINIC | Age: 40
End: 2020-07-01
Payer: COMMERCIAL

## 2020-07-01 VITALS
SYSTOLIC BLOOD PRESSURE: 106 MMHG | HEART RATE: 99 BPM | WEIGHT: 197.19 LBS | BODY MASS INDEX: 33 KG/M2 | DIASTOLIC BLOOD PRESSURE: 69 MMHG

## 2020-07-01 DIAGNOSIS — Z34.83 ENCOUNTER FOR SUPERVISION OF OTHER NORMAL PREGNANCY IN THIRD TRIMESTER: Primary | ICD-10-CM

## 2020-07-01 LAB
APPEARANCE: CLEAR
GLUCOSE (URINE DIPSTICK): 100 MG/DL
MULTISTIX LOT#: NORMAL NUMERIC
PH, URINE: 6.5 (ref 4.5–8)
SPECIFIC GRAVITY: 1.01 (ref 1–1.03)
URINE-COLOR: YELLOW
UROBILINOGEN,SEMI-QN: 0.2 MG/DL (ref 0–1.9)

## 2020-07-01 PROCEDURE — 81002 URINALYSIS NONAUTO W/O SCOPE: CPT | Performed by: OBSTETRICS & GYNECOLOGY

## 2020-07-01 NOTE — PROGRESS NOTES
PT WAS DIABETIC AND TOOK INSULIN IN LAST PREGNANCY SO DOES NOT NEED TO SEE DIABETIC ED THIS TIME. RX CALLED TO PHARMACY FOR NPH INSULIN TO BEGIN TODAY AND ADVISED THEY CAN GIVE NOVOLIN NPH OR HUMULIN NPH, WHICHEVER IS COVERED BY HER INSURANCE. Kristofer Sosa

## 2020-07-02 RX ORDER — BLOOD SUGAR DIAGNOSTIC
STRIP MISCELLANEOUS
Qty: 1 BOX | Status: SHIPPED | OUTPATIENT
Start: 2020-07-02 | End: 2020-10-06

## 2020-07-02 NOTE — TELEPHONE ENCOUNTER
From: Lloyd Parra  Sent: 7/2/2020 9:20 AM CDT  To: [facility] Ob/Gyne Clinical Staff  Subject: RE: Prescription    Thank you, could you also put in an order for the Syringes. I don't have any, I bought 10 for now. The dosage is only for 10 days.  Does this mean

## 2020-07-15 ENCOUNTER — ROUTINE PRENATAL (OUTPATIENT)
Dept: OBGYN CLINIC | Facility: CLINIC | Age: 40
End: 2020-07-15
Payer: COMMERCIAL

## 2020-07-15 ENCOUNTER — HOSPITAL ENCOUNTER (OUTPATIENT)
Dept: PERINATAL CARE | Facility: HOSPITAL | Age: 40
Discharge: HOME | End: 2020-07-15
Attending: OBSTETRICS & GYNECOLOGY
Payer: COMMERCIAL

## 2020-07-15 VITALS
WEIGHT: 197 LBS | BODY MASS INDEX: 33 KG/M2 | SYSTOLIC BLOOD PRESSURE: 125 MMHG | HEART RATE: 94 BPM | DIASTOLIC BLOOD PRESSURE: 77 MMHG

## 2020-07-15 VITALS
HEART RATE: 106 BPM | WEIGHT: 200.63 LBS | DIASTOLIC BLOOD PRESSURE: 77 MMHG | SYSTOLIC BLOOD PRESSURE: 117 MMHG | BODY MASS INDEX: 33 KG/M2

## 2020-07-15 DIAGNOSIS — O09.521 MULTIGRAVIDA OF ADVANCED MATERNAL AGE IN FIRST TRIMESTER: Primary | ICD-10-CM

## 2020-07-15 DIAGNOSIS — O09.521 MULTIGRAVIDA OF ADVANCED MATERNAL AGE IN FIRST TRIMESTER: ICD-10-CM

## 2020-07-15 DIAGNOSIS — O24.419 GESTATIONAL DIABETES MELLITUS, CLASS A2: ICD-10-CM

## 2020-07-15 DIAGNOSIS — O09.523 AMA (ADVANCED MATERNAL AGE) MULTIGRAVIDA 35+, THIRD TRIMESTER: ICD-10-CM

## 2020-07-15 DIAGNOSIS — O24.414 INSULIN CONTROLLED GESTATIONAL DIABETES MELLITUS (GDM) IN THIRD TRIMESTER: ICD-10-CM

## 2020-07-15 DIAGNOSIS — Z34.83 ENCOUNTER FOR SUPERVISION OF OTHER NORMAL PREGNANCY IN THIRD TRIMESTER: Primary | ICD-10-CM

## 2020-07-15 DIAGNOSIS — O24.414 INSULIN CONTROLLED GESTATIONAL DIABETES MELLITUS (GDM) IN SECOND TRIMESTER: ICD-10-CM

## 2020-07-15 LAB
GLUCOSE (URINE DIPSTICK): 500 MG/DL
KETONES (URINE DIPSTICK): 15 MG/DL
MULTISTIX LOT#: NORMAL NUMERIC
PH, URINE: 6.5 (ref 4.5–8)
SPECIFIC GRAVITY: 1.02 (ref 1–1.03)
URINE-COLOR: YELLOW
UROBILINOGEN,SEMI-QN: 0.2 MG/DL (ref 0–1.9)

## 2020-07-15 PROCEDURE — 3074F SYST BP LT 130 MM HG: CPT | Performed by: OBSTETRICS & GYNECOLOGY

## 2020-07-15 PROCEDURE — 3078F DIAST BP <80 MM HG: CPT | Performed by: OBSTETRICS & GYNECOLOGY

## 2020-07-15 PROCEDURE — 76816 OB US FOLLOW-UP PER FETUS: CPT | Performed by: OBSTETRICS & GYNECOLOGY

## 2020-07-15 PROCEDURE — 99214 OFFICE O/P EST MOD 30 MIN: CPT | Performed by: OBSTETRICS & GYNECOLOGY

## 2020-07-15 PROCEDURE — 76819 FETAL BIOPHYS PROFIL W/O NST: CPT | Performed by: OBSTETRICS & GYNECOLOGY

## 2020-07-15 PROCEDURE — 81002 URINALYSIS NONAUTO W/O SCOPE: CPT | Performed by: OBSTETRICS & GYNECOLOGY

## 2020-07-15 NOTE — ADDENDUM NOTE
Encounter addended by: Nolberto Sharma on: 7/15/2020 3:32 PM   Actions taken: Visit diagnoses modified, Charge Capture section accepted

## 2020-07-15 NOTE — PROGRESS NOTES
Outpatient Maternal-Fetal Medicine Consultation    Dear Dr. Hugo Plata    Thank you for requesting ultrasound evaluation and maternal fetal medicine consultation on your patient Cruz Callejas.   As you are aware she is a 47year old female  with a banks p History  The patient She indicated that her mother is alive. She indicated that her father is alive. She indicated that her maternal grandmother is . She indicated that her maternal grandfather is .       Medications:   Current Outpatient Me Disp: 1 kit, Rfl: 0  •  Glucose Blood (FREESTYLE LITE TEST) In Vitro Strip, 1 strip by Finger stick route daily. , Disp: 100 strip, Rfl: 3  •  FreeStyle Lancets Does not apply Misc, 1 each by Finger stick route daily. , Disp: 100 each, Rfl: 3  •  Prenatal 32 a Penrose pregnancy    The fetal measurements are consistent with established EDC. There appears to be a persistent right umbilical vein. The patient understands that ultrasound cannot rule out all structural and chromosomal abnormalities.        NYDIA Inflammation Suggestive Of Cancer Camouflage Histology Text: There was a dense lymphocytic infiltrate which prevented adequate histologic evaluation of adjacent structures.

## 2020-07-21 ENCOUNTER — HOSPITAL ENCOUNTER (OUTPATIENT)
Dept: PERINATAL CARE | Facility: HOSPITAL | Age: 40
Discharge: HOME | End: 2020-07-21
Attending: OBSTETRICS & GYNECOLOGY
Payer: COMMERCIAL

## 2020-07-21 DIAGNOSIS — O24.419 GESTATIONAL DIABETES MELLITUS, CLASS A2: ICD-10-CM

## 2020-07-21 PROCEDURE — 59025 FETAL NON-STRESS TEST: CPT

## 2020-07-21 NOTE — NST
Nonstress Test   Patient: Felisha Rivera    Gestation: 32w6d    NST:  a2gdm ama        Variability: Moderate           Accelerations: Yes           Decelerations: None            Baseline: 140 BPM           Uterine Irritability: Yes           Contractions: No

## 2020-07-28 ENCOUNTER — HOSPITAL ENCOUNTER (OUTPATIENT)
Dept: PERINATAL CARE | Facility: HOSPITAL | Age: 40
Discharge: HOME | End: 2020-07-28
Attending: OBSTETRICS & GYNECOLOGY
Payer: COMMERCIAL

## 2020-07-28 VITALS — HEART RATE: 76 BPM | SYSTOLIC BLOOD PRESSURE: 99 MMHG | DIASTOLIC BLOOD PRESSURE: 52 MMHG

## 2020-07-28 DIAGNOSIS — O24.419 GESTATIONAL DIABETES MELLITUS, CLASS A2: ICD-10-CM

## 2020-07-28 PROCEDURE — 59025 FETAL NON-STRESS TEST: CPT | Performed by: OBSTETRICS & GYNECOLOGY

## 2020-07-28 NOTE — NST
Nonstress Test   Patient: Cruz Callejas    Gestation: 33w6d    NST: A1GDM, AMA       Variability: Moderate           Accelerations: Yes           Decelerations: None            Baseline: 145 BPM           Uterine Irritability: No           Contractions: Not

## 2020-07-30 ENCOUNTER — ROUTINE PRENATAL (OUTPATIENT)
Dept: OBGYN CLINIC | Facility: CLINIC | Age: 40
End: 2020-07-30
Payer: COMMERCIAL

## 2020-07-30 VITALS
SYSTOLIC BLOOD PRESSURE: 107 MMHG | HEART RATE: 99 BPM | DIASTOLIC BLOOD PRESSURE: 75 MMHG | WEIGHT: 203 LBS | BODY MASS INDEX: 34 KG/M2

## 2020-07-30 DIAGNOSIS — Z34.93 ENCOUNTER FOR SUPERVISION OF NORMAL PREGNANCY IN THIRD TRIMESTER, UNSPECIFIED GRAVIDITY: Primary | ICD-10-CM

## 2020-07-30 LAB
APPEARANCE: CLEAR
KETONES (URINE DIPSTICK): 40 MG/DL
MULTISTIX LOT#: NORMAL NUMERIC
PH, URINE: 7 (ref 4.5–8)
PROTEIN (URINE DIPSTICK): 30 MG/DL
SPECIFIC GRAVITY: 1.01 (ref 1–1.03)
URINE-COLOR: YELLOW

## 2020-07-30 PROCEDURE — 3074F SYST BP LT 130 MM HG: CPT | Performed by: OBSTETRICS & GYNECOLOGY

## 2020-07-30 PROCEDURE — 3078F DIAST BP <80 MM HG: CPT | Performed by: OBSTETRICS & GYNECOLOGY

## 2020-07-30 PROCEDURE — 81002 URINALYSIS NONAUTO W/O SCOPE: CPT | Performed by: OBSTETRICS & GYNECOLOGY

## 2020-08-02 PROBLEM — Z30.2 REQUEST FOR STERILIZATION: Status: ACTIVE | Noted: 2020-08-02

## 2020-08-03 ENCOUNTER — PATIENT MESSAGE (OUTPATIENT)
Dept: OBGYN CLINIC | Facility: CLINIC | Age: 40
End: 2020-08-03

## 2020-08-03 NOTE — TELEPHONE ENCOUNTER
From: Larry Burr  To: Miguelito Yan DO  Sent: 8/3/2020 8:19 AM CDT  Subject: Other    I went to sleep early last night and forgot to do the insulin shot, that's why my sugar was high this morning.

## 2020-08-04 ENCOUNTER — HOSPITAL ENCOUNTER (OUTPATIENT)
Dept: PERINATAL CARE | Facility: HOSPITAL | Age: 40
Discharge: HOME | End: 2020-08-04
Attending: OBSTETRICS & GYNECOLOGY
Payer: COMMERCIAL

## 2020-08-04 DIAGNOSIS — O24.419 GESTATIONAL DIABETES MELLITUS, CLASS A2: ICD-10-CM

## 2020-08-04 PROCEDURE — 59025 FETAL NON-STRESS TEST: CPT | Performed by: OBSTETRICS & GYNECOLOGY

## 2020-08-04 NOTE — NST
Nonstress Test   Patient: Boy Wood    Gestation: 34w6d    NST: ama       Variability: Moderate           Accelerations: Yes           Decelerations: None            Baseline: 140 BPM           Uterine Irritability: No           Contractions: Irregular

## 2020-08-06 NOTE — PROGRESS NOTES
Alyssa Rincon  Dear Dr. Maritza Santiago,     Thank you for requesting ultrasound evaluation and maternal fetal medicine consultation on your patient Lloyd Parra.   As you are aware she is a 47year old female  with a Hume rationale for the increased surveillance. We reviewed the signs and symptoms of preeclampsia,  labor and monitoring fetal movement. We discussed reasons for her to call her physician.     GESTATIONAL DIABETES - follow-up  We reviewed the importance A2     Thank you for allowing me to participate in the care of your patient.   Please do not hesitate to contact me if additional questions or concerns arise.       Lawrence Tsang M.D.     The majority of the time (>50%) was spent in review of records, consult

## 2020-08-11 ENCOUNTER — ROUTINE PRENATAL (OUTPATIENT)
Dept: OBGYN CLINIC | Facility: CLINIC | Age: 40
End: 2020-08-11
Payer: COMMERCIAL

## 2020-08-11 ENCOUNTER — APPOINTMENT (OUTPATIENT)
Dept: LAB | Facility: HOSPITAL | Age: 40
End: 2020-08-11
Attending: OBSTETRICS & GYNECOLOGY
Payer: COMMERCIAL

## 2020-08-11 ENCOUNTER — HOSPITAL ENCOUNTER (OUTPATIENT)
Dept: PERINATAL CARE | Facility: HOSPITAL | Age: 40
Discharge: HOME | End: 2020-08-11
Attending: OBSTETRICS & GYNECOLOGY
Payer: COMMERCIAL

## 2020-08-11 VITALS
WEIGHT: 206 LBS | SYSTOLIC BLOOD PRESSURE: 113 MMHG | HEART RATE: 80 BPM | DIASTOLIC BLOOD PRESSURE: 74 MMHG | BODY MASS INDEX: 34 KG/M2

## 2020-08-11 DIAGNOSIS — Z34.83 ENCOUNTER FOR SUPERVISION OF OTHER NORMAL PREGNANCY IN THIRD TRIMESTER: Primary | ICD-10-CM

## 2020-08-11 DIAGNOSIS — O24.419 GESTATIONAL DIABETES MELLITUS, CLASS A2: ICD-10-CM

## 2020-08-11 LAB
APPEARANCE: CLEAR
DEPRECATED RDW RBC AUTO: 38.9 FL (ref 35.1–46.3)
ERYTHROCYTE [DISTWIDTH] IN BLOOD BY AUTOMATED COUNT: 13.6 % (ref 11–15)
HCT VFR BLD AUTO: 36.6 % (ref 35–48)
HGB BLD-MCNC: 11.7 G/DL (ref 12–16)
MCH RBC QN AUTO: 25.6 PG (ref 26–34)
MCHC RBC AUTO-ENTMCNC: 32 G/DL (ref 31–37)
MCV RBC AUTO: 80.1 FL (ref 80–100)
MULTISTIX LOT#: 1044 NUMERIC
PLATELET # BLD AUTO: 151 10(3)UL (ref 150–450)
RBC # BLD AUTO: 4.57 X10(6)UL (ref 3.8–5.3)
URINE-COLOR: YELLOW
WBC # BLD AUTO: 11.1 X10(3) UL (ref 4–11)

## 2020-08-11 PROCEDURE — 59025 FETAL NON-STRESS TEST: CPT | Performed by: OBSTETRICS & GYNECOLOGY

## 2020-08-11 PROCEDURE — 3074F SYST BP LT 130 MM HG: CPT | Performed by: OBSTETRICS & GYNECOLOGY

## 2020-08-11 PROCEDURE — 3078F DIAST BP <80 MM HG: CPT | Performed by: OBSTETRICS & GYNECOLOGY

## 2020-08-11 PROCEDURE — 87389 HIV-1 AG W/HIV-1&-2 AB AG IA: CPT | Performed by: OBSTETRICS & GYNECOLOGY

## 2020-08-11 PROCEDURE — 81002 URINALYSIS NONAUTO W/O SCOPE: CPT | Performed by: OBSTETRICS & GYNECOLOGY

## 2020-08-11 PROCEDURE — 36415 COLL VENOUS BLD VENIPUNCTURE: CPT | Performed by: OBSTETRICS & GYNECOLOGY

## 2020-08-11 PROCEDURE — 85027 COMPLETE CBC AUTOMATED: CPT | Performed by: OBSTETRICS & GYNECOLOGY

## 2020-08-11 PROCEDURE — 86780 TREPONEMA PALLIDUM: CPT | Performed by: OBSTETRICS & GYNECOLOGY

## 2020-08-11 NOTE — PROGRESS NOTES
No issues. BS log reviewed on 4N. Well controlled. Doing NSTs. Did 3T labs this AM.  GBS next visit. Has growth next week. RTC 1 wk.

## 2020-08-11 NOTE — NST
Nonstress Test   Patient: Reilly Jones    Gestation: 35w6d    NST:  AMA GDM       Variability: Moderate           Accelerations: Yes           Decelerations: None            Baseline: 140 BPM           Uterine Irritability: Yes           Contractions: Not p

## 2020-08-12 LAB — T PALLIDUM AB SER QL: NEGATIVE

## 2020-08-13 ENCOUNTER — HOSPITAL ENCOUNTER (OUTPATIENT)
Dept: PERINATAL CARE | Facility: HOSPITAL | Age: 40
Discharge: HOME | End: 2020-08-13
Attending: OBSTETRICS & GYNECOLOGY
Payer: COMMERCIAL

## 2020-08-13 VITALS
HEART RATE: 77 BPM | SYSTOLIC BLOOD PRESSURE: 129 MMHG | WEIGHT: 203 LBS | BODY MASS INDEX: 34 KG/M2 | DIASTOLIC BLOOD PRESSURE: 67 MMHG

## 2020-08-13 VITALS — DIASTOLIC BLOOD PRESSURE: 67 MMHG | SYSTOLIC BLOOD PRESSURE: 129 MMHG | HEART RATE: 77 BPM

## 2020-08-13 DIAGNOSIS — O09.521 MULTIGRAVIDA OF ADVANCED MATERNAL AGE IN FIRST TRIMESTER: ICD-10-CM

## 2020-08-13 DIAGNOSIS — O24.414 INSULIN CONTROLLED GESTATIONAL DIABETES MELLITUS (GDM) IN THIRD TRIMESTER: Primary | ICD-10-CM

## 2020-08-13 DIAGNOSIS — O36.63X0 EXCESSIVE FETAL GROWTH AFFECTING MANAGEMENT OF PREGNANCY IN THIRD TRIMESTER, SINGLE OR UNSPECIFIED FETUS: ICD-10-CM

## 2020-08-13 DIAGNOSIS — O24.414 INSULIN CONTROLLED GESTATIONAL DIABETES MELLITUS (GDM) IN THIRD TRIMESTER: ICD-10-CM

## 2020-08-13 PROCEDURE — 76816 OB US FOLLOW-UP PER FETUS: CPT | Performed by: OBSTETRICS & GYNECOLOGY

## 2020-08-13 PROCEDURE — 99214 OFFICE O/P EST MOD 30 MIN: CPT | Performed by: OBSTETRICS & GYNECOLOGY

## 2020-08-13 PROCEDURE — 76819 FETAL BIOPHYS PROFIL W/O NST: CPT | Performed by: OBSTETRICS & GYNECOLOGY

## 2020-08-13 NOTE — ADDENDUM NOTE
Encounter addended by: Solo Barnett MD on: 8/13/2020 8:41 AM   Actions taken: Clinical Note Signed, Charge Capture section accepted

## 2020-08-18 ENCOUNTER — PATIENT MESSAGE (OUTPATIENT)
Dept: OBGYN CLINIC | Facility: CLINIC | Age: 40
End: 2020-08-18

## 2020-08-18 ENCOUNTER — HOSPITAL ENCOUNTER (OUTPATIENT)
Dept: PERINATAL CARE | Facility: HOSPITAL | Age: 40
Discharge: HOME | End: 2020-08-18
Attending: OBSTETRICS & GYNECOLOGY
Payer: COMMERCIAL

## 2020-08-18 DIAGNOSIS — O09.521 MULTIGRAVIDA OF ADVANCED MATERNAL AGE IN FIRST TRIMESTER: ICD-10-CM

## 2020-08-18 DIAGNOSIS — O24.419 GESTATIONAL DIABETES MELLITUS, CLASS A2: ICD-10-CM

## 2020-08-18 PROCEDURE — 59025 FETAL NON-STRESS TEST: CPT | Performed by: OBSTETRICS & GYNECOLOGY

## 2020-08-18 NOTE — NST
Nonstress Test   Patient: Lm Slimmer    Gestation: 36w6d    NST: ama       Variability: Moderate           Accelerations: Yes           Decelerations: None            Baseline: 140 BPM           Uterine Irritability: No           Contractions: Irregular

## 2020-08-19 ENCOUNTER — ROUTINE PRENATAL (OUTPATIENT)
Dept: OBGYN CLINIC | Facility: CLINIC | Age: 40
End: 2020-08-19
Payer: COMMERCIAL

## 2020-08-19 VITALS
WEIGHT: 213.63 LBS | SYSTOLIC BLOOD PRESSURE: 137 MMHG | DIASTOLIC BLOOD PRESSURE: 86 MMHG | HEART RATE: 109 BPM | BODY MASS INDEX: 36 KG/M2

## 2020-08-19 DIAGNOSIS — Z34.83 ENCOUNTER FOR SUPERVISION OF OTHER NORMAL PREGNANCY IN THIRD TRIMESTER: Primary | ICD-10-CM

## 2020-08-19 LAB
MULTISTIX EXPIRATION DATE: 9520 DATE
MULTISTIX LOT#: 1044 NUMERIC
PH, URINE: 7 (ref 4.5–8)
SPECIFIC GRAVITY: 1.02 (ref 1–1.03)
UROBILINOGEN,SEMI-QN: 0.2 MG/DL (ref 0–1.9)

## 2020-08-19 PROCEDURE — 3075F SYST BP GE 130 - 139MM HG: CPT | Performed by: OBSTETRICS & GYNECOLOGY

## 2020-08-19 PROCEDURE — 81002 URINALYSIS NONAUTO W/O SCOPE: CPT | Performed by: OBSTETRICS & GYNECOLOGY

## 2020-08-19 PROCEDURE — 3079F DIAST BP 80-89 MM HG: CPT | Performed by: OBSTETRICS & GYNECOLOGY

## 2020-08-19 NOTE — TELEPHONE ENCOUNTER
From: Janet Carlson  To: Korin Dorman MD  Sent: 8/18/2020 7:11 PM CDT  Subject: Other    Sugar numbers

## 2020-08-20 NOTE — ADDENDUM NOTE
Encounter addended by: Krunal Kimble MD on: 8/20/2020 10:10 AM   Actions taken: Clinical Note Signed, Visit diagnoses modified, Charge Capture section accepted

## 2020-08-25 ENCOUNTER — ROUTINE PRENATAL (OUTPATIENT)
Dept: OBGYN CLINIC | Facility: CLINIC | Age: 40
End: 2020-08-25
Payer: COMMERCIAL

## 2020-08-25 ENCOUNTER — HOSPITAL ENCOUNTER (OUTPATIENT)
Dept: PERINATAL CARE | Facility: HOSPITAL | Age: 40
Discharge: HOME | End: 2020-08-25
Attending: OBSTETRICS & GYNECOLOGY
Payer: COMMERCIAL

## 2020-08-25 VITALS
SYSTOLIC BLOOD PRESSURE: 134 MMHG | WEIGHT: 211 LBS | BODY MASS INDEX: 35 KG/M2 | HEART RATE: 88 BPM | DIASTOLIC BLOOD PRESSURE: 84 MMHG

## 2020-08-25 DIAGNOSIS — O24.419 GESTATIONAL DIABETES MELLITUS, CLASS A2: ICD-10-CM

## 2020-08-25 DIAGNOSIS — Z34.83 ENCOUNTER FOR SUPERVISION OF OTHER NORMAL PREGNANCY IN THIRD TRIMESTER: Primary | ICD-10-CM

## 2020-08-25 LAB
APPEARANCE: CLEAR
MULTISTIX LOT#: 1044 NUMERIC
URINE-COLOR: YELLOW

## 2020-08-25 PROCEDURE — 59025 FETAL NON-STRESS TEST: CPT | Performed by: OBSTETRICS & GYNECOLOGY

## 2020-08-25 PROCEDURE — 81002 URINALYSIS NONAUTO W/O SCOPE: CPT | Performed by: OBSTETRICS & GYNECOLOGY

## 2020-08-25 PROCEDURE — 3075F SYST BP GE 130 - 139MM HG: CPT | Performed by: OBSTETRICS & GYNECOLOGY

## 2020-08-25 PROCEDURE — 3079F DIAST BP 80-89 MM HG: CPT | Performed by: OBSTETRICS & GYNECOLOGY

## 2020-08-25 NOTE — NST
Nonstress Test   Patient: Gloria Fails    Gestation: 37w6d    NST: ama ivf a2gdm       Variability: Moderate           Accelerations: Yes           Decelerations: None            Baseline: 135 BPM           Uterine Irritability: No           Contractions: N

## 2020-08-25 NOTE — ADDENDUM NOTE
Encounter addended by: Dusty Coyle DO on: 8/25/2020 2:34 PM   Actions taken: Clinical Note Signed, Charge Capture section accepted

## 2020-08-25 NOTE — PROGRESS NOTES
Pt is scheduled for ripening tomorrow night but cervix now favorable- will have pt come in Thursday a instead of Wednesday pm

## 2020-08-26 ENCOUNTER — TELEPHONE (OUTPATIENT)
Dept: OBGYN UNIT | Facility: HOSPITAL | Age: 40
End: 2020-08-26

## 2020-08-26 NOTE — ADDENDUM NOTE
Encounter addended by: Karen Ontiveros DO on: 8/26/2020 8:49 AM   Actions taken: Clinical Note Signed, Charge Capture section accepted

## 2020-08-27 ENCOUNTER — ANESTHESIA EVENT (OUTPATIENT)
Dept: OBGYN UNIT | Facility: HOSPITAL | Age: 40
End: 2020-08-27
Payer: COMMERCIAL

## 2020-08-27 ENCOUNTER — ANESTHESIA (OUTPATIENT)
Dept: OBGYN UNIT | Facility: HOSPITAL | Age: 40
End: 2020-08-27
Payer: COMMERCIAL

## 2020-08-27 ENCOUNTER — HOSPITAL ENCOUNTER (INPATIENT)
Facility: HOSPITAL | Age: 40
LOS: 2 days | Discharge: HOME | End: 2020-08-29
Attending: OBSTETRICS & GYNECOLOGY | Admitting: OBSTETRICS & GYNECOLOGY
Payer: COMMERCIAL

## 2020-08-27 ENCOUNTER — APPOINTMENT (OUTPATIENT)
Dept: OBGYN CLINIC | Facility: HOSPITAL | Age: 40
End: 2020-08-27
Payer: COMMERCIAL

## 2020-08-27 PROBLEM — Z34.90 PREGNANCY: Status: ACTIVE | Noted: 2020-08-27

## 2020-08-27 LAB
ANTIBODY SCREEN: NEGATIVE
BASOPHILS # BLD AUTO: 0.05 X10(3) UL (ref 0–0.2)
BASOPHILS NFR BLD AUTO: 0.4 %
DEPRECATED RDW RBC AUTO: 42.5 FL (ref 35.1–46.3)
EOSINOPHIL # BLD AUTO: 0.13 X10(3) UL (ref 0–0.7)
EOSINOPHIL NFR BLD AUTO: 1.1 %
ERYTHROCYTE [DISTWIDTH] IN BLOOD BY AUTOMATED COUNT: 15.1 % (ref 11–15)
GLUCOSE BLDC GLUCOMTR-MCNC: 112 MG/DL (ref 70–99)
GLUCOSE BLDC GLUCOMTR-MCNC: 77 MG/DL (ref 70–99)
GLUCOSE BLDC GLUCOMTR-MCNC: 87 MG/DL (ref 70–99)
GLUCOSE BLDC GLUCOMTR-MCNC: 88 MG/DL (ref 70–99)
GLUCOSE BLDC GLUCOMTR-MCNC: 94 MG/DL (ref 70–99)
HCT VFR BLD AUTO: 33.2 % (ref 35–48)
HGB BLD-MCNC: 10.8 G/DL (ref 12–16)
IMM GRANULOCYTES # BLD AUTO: 0.12 X10(3) UL (ref 0–1)
IMM GRANULOCYTES NFR BLD: 1 %
LYMPHOCYTES # BLD AUTO: 1.74 X10(3) UL (ref 1–4)
LYMPHOCYTES NFR BLD AUTO: 15.2 %
MCH RBC QN AUTO: 25.7 PG (ref 26–34)
MCHC RBC AUTO-ENTMCNC: 32.5 G/DL (ref 31–37)
MCV RBC AUTO: 78.9 FL (ref 80–100)
MONOCYTES # BLD AUTO: 0.63 X10(3) UL (ref 0.1–1)
MONOCYTES NFR BLD AUTO: 5.5 %
NEUTROPHILS # BLD AUTO: 8.79 X10 (3) UL (ref 1.5–7.7)
NEUTROPHILS # BLD AUTO: 8.79 X10(3) UL (ref 1.5–7.7)
NEUTROPHILS NFR BLD AUTO: 76.8 %
PLATELET # BLD AUTO: 145 10(3)UL (ref 150–450)
RBC # BLD AUTO: 4.21 X10(6)UL (ref 3.8–5.3)
RH BLOOD TYPE: POSITIVE
SARS-COV-2 RNA RESP QL NAA+PROBE: NOT DETECTED
WBC # BLD AUTO: 11.5 X10(3) UL (ref 4–11)

## 2020-08-27 PROCEDURE — 3E033VJ INTRODUCTION OF OTHER HORMONE INTO PERIPHERAL VEIN, PERCUTANEOUS APPROACH: ICD-10-PCS | Performed by: OBSTETRICS & GYNECOLOGY

## 2020-08-27 PROCEDURE — 59400 OBSTETRICAL CARE: CPT | Performed by: OBSTETRICS & GYNECOLOGY

## 2020-08-27 PROCEDURE — 0KQM0ZZ REPAIR PERINEUM MUSCLE, OPEN APPROACH: ICD-10-PCS | Performed by: OBSTETRICS & GYNECOLOGY

## 2020-08-27 RX ORDER — BISACODYL 10 MG
10 SUPPOSITORY, RECTAL RECTAL ONCE AS NEEDED
Status: DISCONTINUED | OUTPATIENT
Start: 2020-08-27 | End: 2020-08-29

## 2020-08-27 RX ORDER — BUPIVACAINE HYDROCHLORIDE 2.5 MG/ML
INJECTION, SOLUTION EPIDURAL; INFILTRATION; INTRACAUDAL
Status: DISPENSED
Start: 2020-08-27 | End: 2020-08-28

## 2020-08-27 RX ORDER — DIAPER,BRIEF,INFANT-TODD,DISP
1 EACH MISCELLANEOUS EVERY 6 HOURS PRN
Status: DISCONTINUED | OUTPATIENT
Start: 2020-08-27 | End: 2020-08-29

## 2020-08-27 RX ORDER — AMMONIA INHALANTS 0.04 G/.3ML
0.3 INHALANT RESPIRATORY (INHALATION) AS NEEDED
Status: DISCONTINUED | OUTPATIENT
Start: 2020-08-27 | End: 2020-08-27 | Stop reason: HOSPADM

## 2020-08-27 RX ORDER — BUPIVACAINE HYDROCHLORIDE 2.5 MG/ML
INJECTION, SOLUTION EPIDURAL; INFILTRATION; INTRACAUDAL
Status: COMPLETED | OUTPATIENT
Start: 2020-08-27 | End: 2020-08-27

## 2020-08-27 RX ORDER — IBUPROFEN 600 MG/1
600 TABLET ORAL EVERY 6 HOURS PRN
Status: DISCONTINUED | OUTPATIENT
Start: 2020-08-27 | End: 2020-08-27

## 2020-08-27 RX ORDER — DEXTROSE, SODIUM CHLORIDE, SODIUM LACTATE, POTASSIUM CHLORIDE, AND CALCIUM CHLORIDE 5; .6; .31; .03; .02 G/100ML; G/100ML; G/100ML; G/100ML; G/100ML
INJECTION, SOLUTION INTRAVENOUS AS NEEDED
Status: DISCONTINUED | OUTPATIENT
Start: 2020-08-27 | End: 2020-08-27 | Stop reason: HOSPADM

## 2020-08-27 RX ORDER — SIMETHICONE 80 MG
80 TABLET,CHEWABLE ORAL 3 TIMES DAILY PRN
Status: DISCONTINUED | OUTPATIENT
Start: 2020-08-27 | End: 2020-08-29

## 2020-08-27 RX ORDER — IBUPROFEN 600 MG/1
600 TABLET ORAL EVERY 6 HOURS PRN
Status: DISCONTINUED | OUTPATIENT
Start: 2020-08-27 | End: 2020-08-27 | Stop reason: HOSPADM

## 2020-08-27 RX ORDER — METHYLERGONOVINE MALEATE 0.2 MG/ML
INJECTION INTRAVENOUS
Status: COMPLETED
Start: 2020-08-27 | End: 2020-08-27

## 2020-08-27 RX ORDER — LIDOCAINE HYDROCHLORIDE 10 MG/ML
30 INJECTION, SOLUTION EPIDURAL; INFILTRATION; INTRACAUDAL; PERINEURAL ONCE
Status: DISCONTINUED | OUTPATIENT
Start: 2020-08-27 | End: 2020-08-27 | Stop reason: HOSPADM

## 2020-08-27 RX ORDER — 0.9 % SODIUM CHLORIDE 0.9 %
VIAL (ML) INJECTION
Status: DISPENSED
Start: 2020-08-27 | End: 2020-08-28

## 2020-08-27 RX ORDER — LIDOCAINE HYDROCHLORIDE AND EPINEPHRINE 15; 5 MG/ML; UG/ML
INJECTION, SOLUTION EPIDURAL
Status: COMPLETED | OUTPATIENT
Start: 2020-08-27 | End: 2020-08-27

## 2020-08-27 RX ORDER — LIDOCAINE HYDROCHLORIDE 10 MG/ML
INJECTION, SOLUTION INFILTRATION; PERINEURAL
Status: COMPLETED | OUTPATIENT
Start: 2020-08-27 | End: 2020-08-27

## 2020-08-27 RX ORDER — METOCLOPRAMIDE HYDROCHLORIDE 5 MG/ML
INJECTION INTRAMUSCULAR; INTRAVENOUS
Status: COMPLETED
Start: 2020-08-27 | End: 2020-08-27

## 2020-08-27 RX ORDER — DOCUSATE SODIUM 100 MG/1
100 CAPSULE, LIQUID FILLED ORAL
Status: DISCONTINUED | OUTPATIENT
Start: 2020-08-27 | End: 2020-08-29

## 2020-08-27 RX ORDER — METOCLOPRAMIDE HYDROCHLORIDE 5 MG/ML
10 INJECTION INTRAMUSCULAR; INTRAVENOUS EVERY 6 HOURS PRN
Status: DISCONTINUED | OUTPATIENT
Start: 2020-08-27 | End: 2020-08-29

## 2020-08-27 RX ORDER — SCOLOPAMINE TRANSDERMAL SYSTEM 1 MG/1
1 PATCH, EXTENDED RELEASE TRANSDERMAL
Status: DISCONTINUED | OUTPATIENT
Start: 2020-08-27 | End: 2020-08-29

## 2020-08-27 RX ORDER — AMMONIA INHALANTS 0.04 G/.3ML
0.3 INHALANT RESPIRATORY (INHALATION) AS NEEDED
Status: DISCONTINUED | OUTPATIENT
Start: 2020-08-27 | End: 2020-08-29

## 2020-08-27 RX ORDER — ONDANSETRON 2 MG/ML
4 INJECTION INTRAMUSCULAR; INTRAVENOUS EVERY 6 HOURS PRN
Status: DISCONTINUED | OUTPATIENT
Start: 2020-08-27 | End: 2020-08-27 | Stop reason: HOSPADM

## 2020-08-27 RX ORDER — ONDANSETRON 2 MG/ML
4 INJECTION INTRAMUSCULAR; INTRAVENOUS EVERY 6 HOURS PRN
Status: DISCONTINUED | OUTPATIENT
Start: 2020-08-27 | End: 2020-08-29

## 2020-08-27 RX ORDER — ACETAMINOPHEN 500 MG
500 TABLET ORAL EVERY 6 HOURS PRN
Status: DISCONTINUED | OUTPATIENT
Start: 2020-08-27 | End: 2020-08-27 | Stop reason: HOSPADM

## 2020-08-27 RX ORDER — MISOPROSTOL 200 UG/1
TABLET ORAL
Status: DISCONTINUED
Start: 2020-08-27 | End: 2020-08-27 | Stop reason: WASHOUT

## 2020-08-27 RX ORDER — TRISODIUM CITRATE DIHYDRATE AND CITRIC ACID MONOHYDRATE 500; 334 MG/5ML; MG/5ML
30 SOLUTION ORAL AS NEEDED
Status: DISCONTINUED | OUTPATIENT
Start: 2020-08-27 | End: 2020-08-27 | Stop reason: HOSPADM

## 2020-08-27 RX ORDER — SODIUM CHLORIDE, SODIUM LACTATE, POTASSIUM CHLORIDE, CALCIUM CHLORIDE 600; 310; 30; 20 MG/100ML; MG/100ML; MG/100ML; MG/100ML
INJECTION, SOLUTION INTRAVENOUS CONTINUOUS
Status: DISCONTINUED | OUTPATIENT
Start: 2020-08-27 | End: 2020-08-27 | Stop reason: HOSPADM

## 2020-08-27 RX ORDER — TERBUTALINE SULFATE 1 MG/ML
0.25 INJECTION, SOLUTION SUBCUTANEOUS AS NEEDED
Status: DISCONTINUED | OUTPATIENT
Start: 2020-08-27 | End: 2020-08-27 | Stop reason: HOSPADM

## 2020-08-27 RX ORDER — ACETAMINOPHEN 325 MG/1
650 TABLET ORAL EVERY 6 HOURS PRN
Status: DISCONTINUED | OUTPATIENT
Start: 2020-08-27 | End: 2020-08-29

## 2020-08-27 RX ORDER — EPHEDRINE SULFATE/0.9% NACL/PF 25 MG/5 ML
SYRINGE (ML) INTRAVENOUS
Status: DISPENSED
Start: 2020-08-27 | End: 2020-08-28

## 2020-08-27 RX ADMIN — BUPIVACAINE HYDROCHLORIDE 10 ML: 2.5 INJECTION, SOLUTION EPIDURAL; INFILTRATION; INTRACAUDAL at 13:36:00

## 2020-08-27 RX ADMIN — LIDOCAINE HYDROCHLORIDE AND EPINEPHRINE 3 ML: 15; 5 INJECTION, SOLUTION EPIDURAL at 13:36:00

## 2020-08-27 RX ADMIN — LIDOCAINE HYDROCHLORIDE 5 ML: 10 INJECTION, SOLUTION INFILTRATION; PERINEURAL at 13:36:00

## 2020-08-27 NOTE — ANESTHESIA PROCEDURE NOTES
Labor Analgesia  Date/Time: 8/27/2020 1:36 PM  Performed by: Vasquez Petersen MD  Authorized by: Vasquez Petersen MD       General Information and Staff    Start Time:  8/27/2020 1:30 PM  Anesthesiologist:  Vasquez Petersen MD  Patient Location[facility]  [facility]

## 2020-08-27 NOTE — PROGRESS NOTES
Pt is a 47year old female admitted to [place] [address]. Patient presents with:  Scheduled Induction: A2GDM     Pt is  38w1d intra-uterine pregnancy. History obtained, consents signed. Oriented to room, staff, and plan of care.

## 2020-08-27 NOTE — H&P
[address] Patient Status:  Inpatient    1980 MRN [id number]   Location [address] [place] [address] Attending [facility], [address] [facility] Day # 0 PCP Nilda Colón MD     Date of Admi Blighted ovum.  D&C      Past Medical History:   Past Medical History:   Diagnosis Date   • Abnormal Pap smear of cervix     in 2011   • Decorative tattoo    • Gestational diabetes 2017   • High cholesterol    • Human papilloma virus infection    • Right ca Kit, 1 kit by Does not apply route 4 (four) times daily. , Disp: 1 kit, Rfl: 0, Taking  Glucose Blood (ONETOUCH VERIO) In Vitro Strip, 1 strip by Finger stick route 4 (four) times daily. Please provide preferred strip per pt's insurance. Use as directed. , x10(3) uL    RBC 4.21 3.80 - 5.30 x10(6)uL    HGB 10.8 (L) 12.0 - 16.0 g/dL    HCT 33.2 (L) 35.0 - 48.0 %    MCV 78.9 (L) 80.0 - 100.0 fL    MCH 25.7 (L) 26.0 - 34.0 pg    MCHC 32.5 31.0 - 37.0 g/dL    RDW-SD 42.5 35.1 - 46.3 fL    RDW 15.1 (H) 11.0 - 15. 0

## 2020-08-27 NOTE — ANESTHESIA PREPROCEDURE EVALUATION
Anesthesia PreOp Note    HPI:     Yasir Erickson is a 47year old female who presents for preoperative consultation requested by: * No surgeons listed *    Date of Surgery: 8/27/2020    * No procedures listed *  Indication: * No pre-op diagnosis entered * daily., Disp: , Rfl: , 8/26/2020 at Unknown time  Insulin Syringe-Needle U-100 (BD SAFETYGLIDE INSULIN SYRINGE) 31G X 5/16\" 0.3 ML Does not apply Misc, Use as directed, Disp: 1 Box, Rfl: prn, Taking  Blood Glucose Monitoring Suppl (ONETOUCH VERIO FLEX SYS Scot Barnard,   Ammonia Aromatic (ammonia) nasal solution 0.3 mL, 0.3 mL, Nasal, PRN, Renita Meals, DO  lactated ringers infusion, , Intravenous, Continuous, Renita Meals, DO, Last Rate: 125 mL/hr at 08/27/20 0635  dextrose 5 % /lactated ringers i file      Highest education level: Not on file    Occupational History      Not on file    Social Needs      Financial resource strain: Not on file      Food insecurity:        Worry: Not on file        Inability: Not on file      Transportation needs: and weight is 96.6 kg (213 lb). Her temperature is 98.3 °F (36.8 °C). Her blood pressure is 123/78 and her pulse is 80.  Her respiration is 18.    08/27/20  1100 08/27/20  1115 08/27/20  1130 08/27/20  1145   BP: 116/69 123/75 138/86 123/78   Pulse: 69 71 7

## 2020-08-28 LAB
BASOPHILS # BLD AUTO: 0.04 X10(3) UL (ref 0–0.2)
BASOPHILS NFR BLD AUTO: 0.2 %
DEPRECATED RDW RBC AUTO: 43.6 FL (ref 35.1–46.3)
EOSINOPHIL # BLD AUTO: 0.02 X10(3) UL (ref 0–0.7)
EOSINOPHIL NFR BLD AUTO: 0.1 %
ERYTHROCYTE [DISTWIDTH] IN BLOOD BY AUTOMATED COUNT: 15.3 % (ref 11–15)
GLUCOSE BLDC GLUCOMTR-MCNC: 120 MG/DL (ref 70–99)
HCT VFR BLD AUTO: 27.6 % (ref 35–48)
HGB BLD-MCNC: 8.8 G/DL (ref 12–16)
IMM GRANULOCYTES # BLD AUTO: 0.28 X10(3) UL (ref 0–1)
IMM GRANULOCYTES NFR BLD: 1.5 %
LYMPHOCYTES # BLD AUTO: 1.42 X10(3) UL (ref 1–4)
LYMPHOCYTES NFR BLD AUTO: 7.5 %
MCH RBC QN AUTO: 25.3 PG (ref 26–34)
MCHC RBC AUTO-ENTMCNC: 31.9 G/DL (ref 31–37)
MCV RBC AUTO: 79.3 FL (ref 80–100)
MONOCYTES # BLD AUTO: 0.97 X10(3) UL (ref 0.1–1)
MONOCYTES NFR BLD AUTO: 5.2 %
NEUTROPHILS # BLD AUTO: 16.1 X10 (3) UL (ref 1.5–7.7)
NEUTROPHILS # BLD AUTO: 16.1 X10(3) UL (ref 1.5–7.7)
NEUTROPHILS NFR BLD AUTO: 85.5 %
PLATELET # BLD AUTO: 138 10(3)UL (ref 150–450)
RBC # BLD AUTO: 3.48 X10(6)UL (ref 3.8–5.3)
WBC # BLD AUTO: 18.8 X10(3) UL (ref 4–11)

## 2020-08-28 RX ORDER — IBUPROFEN 600 MG/1
600 TABLET ORAL EVERY 6 HOURS PRN
Status: DISCONTINUED | OUTPATIENT
Start: 2020-08-28 | End: 2020-08-29

## 2020-08-28 RX ORDER — HYDROCODONE BITARTRATE AND ACETAMINOPHEN 5; 325 MG/1; MG/1
1 TABLET ORAL EVERY 6 HOURS PRN
Status: DISCONTINUED | OUTPATIENT
Start: 2020-08-28 | End: 2020-08-29

## 2020-08-28 NOTE — PROGRESS NOTES
[facility] - [facility]    OB/Gyne Post  Progress Note      Janet Litter Patient Status:  Inpatient    1980 MRN [id number]   Location [facility] 3SE Attending Teddy Sanchez, [address] [facility] Day # 1 PCP Coral Weiss MD       Subjective 1. 42 1.00 - 4.00 x10(3) uL    Monocyte Absolute 0.97 0.10 - 1.00 x10(3) uL    Eosinophil Absolute 0.02 0.00 - 0.70 x10(3) uL    Basophil Absolute 0.04 0.00 - 0.20 x10(3) uL    Immature Granulocyte Absolute 0.28 0.00 - 1.00 x10(3) uL    Neutrophil % 85.5 %

## 2020-08-28 NOTE — LACTATION NOTE
LACTATION NOTE - MOTHER      Evaluation Type: Inpatient    Problems identified  Problems identified: Knowledge deficit    Maternal history  Maternal history: Gestational diabetes  Other/comment: +GBS, R carpal tunnel    Breastfeeding goal  Breastfeeding go

## 2020-08-28 NOTE — ADDENDUM NOTE
Encounter addended by: Oleg Gaitan MD on: 8/28/2020 9:38 AM   Actions taken: Clinical Note Signed, Charge Capture section accepted

## 2020-08-28 NOTE — PLAN OF CARE
Sat with patient to review plan of care. Discussed analgesic options and answered all questions. VSS. Breastfeeding on demand. Breastfeeding successfully. Voiding independently. Lochia is WNL. Uterus is firm.      Problem: PAIN - ADULT  Goal: Verbalizes/dis incision/episiotomy/laceration, and assess for signs and symptoms of infection and hematoma. - Assess bladder function and monitor for bladder distention.  - Provide/instruct/assist with pericare as needed. - Provide VTE prophylaxis as needed.   - Monitor feeding on demand.  - Encourage skin-to-skin contact. - Provide  support as needed. - Assess for and manage engorgement. - Provide breast feeding education handouts and information on community breast feeding support.    Outcome: Progressing  Goal: Est

## 2020-08-28 NOTE — L&D DELIVERY NOTE
[facility]    Vaginal Delivery Note    Arminda Galdamez Patient Status:  Inpatient    1980 MRN [id number]   Location [address]  Attending Mukesh Raphael, [address] [facility] Day # 0 PCP Tracie Burns MD       Deliver

## 2020-08-28 NOTE — PROGRESS NOTES
Patient up to bathroom with assist x 2. Unable to void at this time. Patient transferred to mother/baby room 351 per wheelchair in stable condition with baby and personal belongings. Accompanied by significant other and staff.   Report given to mother/baby

## 2020-08-28 NOTE — DISCHARGE SUMMARY
[facility]    Discharge Summary    Ashly Davies Patient Status:  Inpatient    1980 MRN [id number]   Location [address] [facility] Attending Mya Sarah, [address] [facility] Day # 0       Admission Date:  2020    Paige Kuo

## 2020-08-28 NOTE — ANESTHESIA POSTPROCEDURE EVALUATION
Patient: Sana Juarez    Procedure Summary     Date:  08/27/20 Room / Location:      Anesthesia Start:  1330 Anesthesia Stop:  2008    Procedure:  LABOR ANALGESIA Diagnosis:      Scheduled Providers:   Anesthesiologist:  Fred Cueva MD    Anesthesia T

## 2020-08-28 NOTE — LACTATION NOTE
This note was copied from a baby's chart.   LACTATION NOTE - INFANT    Evaluation Type  Evaluation Type: Inpatient    Feeding Assessment  Summary Current Feeding: Adlib;Breastfeeding with formula supplement    Mom states she is supplementing with formula be

## 2020-08-29 VITALS
TEMPERATURE: 98 F | DIASTOLIC BLOOD PRESSURE: 74 MMHG | OXYGEN SATURATION: 97 % | SYSTOLIC BLOOD PRESSURE: 119 MMHG | RESPIRATION RATE: 16 BRPM | HEART RATE: 69 BPM | HEIGHT: 65 IN | BODY MASS INDEX: 35.49 KG/M2 | WEIGHT: 213 LBS

## 2020-08-29 RX ORDER — IBUPROFEN 600 MG/1
600 TABLET ORAL EVERY 6 HOURS PRN
Qty: 60 TABLET | Refills: 0 | Status: SHIPPED | OUTPATIENT
Start: 2020-08-29 | End: 2020-10-06

## 2020-08-29 RX ORDER — PSEUDOEPHEDRINE HCL 30 MG
100 TABLET ORAL 2 TIMES DAILY PRN
Qty: 60 CAPSULE | Refills: 0 | Status: SHIPPED | OUTPATIENT
Start: 2020-08-29 | End: 2020-10-06

## 2020-08-29 NOTE — PLAN OF CARE
Problem: PAIN - ADULT  Goal: Verbalizes/displays adequate comfort level or patient's stated pain goal  Description  INTERVENTIONS:  - Encourage pt to monitor pain and request assistance  - Assess pain using appropriate pain scale  - Administer analgesics VTE prophylaxis as needed. - Monitor bowel function.  - Encourage ambulation and provide assistance as needed. - Assess and monitor emotional status and provide social service/psych resources as needed.   - Utilize standard precautions and use personal pr Outcome: Completed  Goal: Establishment of adequate milk supply with medication/procedure interruptions  Description  INTERVENTIONS:  - Review techniques for milk expression (breast pumping).    - Provide pumping equipment/supplies, instructions, and assi

## 2020-08-29 NOTE — PROGRESS NOTES
Post-Partum Note   8/29/2020, 6:49 AM    Subjective:  Patient doing well. Pain is well controlled. She is ambulating without lightheadedness or dizziness. Denies fevers or chills. Denies SOB, CP. She is ready to go home today.      Objective:   08/28/20  04

## 2020-09-08 ENCOUNTER — TELEPHONE (OUTPATIENT)
Dept: OBGYN UNIT | Facility: HOSPITAL | Age: 40
End: 2020-09-08

## 2020-09-10 ENCOUNTER — NURSE ONLY (OUTPATIENT)
Dept: LACTATION | Facility: HOSPITAL | Age: 40
End: 2020-09-10
Attending: OBSTETRICS & GYNECOLOGY
Payer: COMMERCIAL

## 2020-09-10 DIAGNOSIS — O92.79 DISORDER OF LACTATION, POSTPARTUM CONDITION OR COMPLICATION: Primary | ICD-10-CM

## 2020-09-10 PROCEDURE — 99213 OFFICE O/P EST LOW 20 MIN: CPT

## 2020-09-10 NOTE — PROGRESS NOTES
LACTATION NOTE - MOTHER      Evaluation Type: Outpatient Initial    Problems identified  Problems identified: Knowledge deficit;Milk supply not WNL  Milk supply not WNL: Delayed lactogenesis II;Reduced (potential)    Maternal history  Maternal history: Ges maintain latch, nipple shield used, was uncoordinated with nipple shield but maintained latch better.  Only transferred 8ml at breast. Was very fussy during feed, mom then gave 1oz of formula and attempted to put Patricia back to breast, he latched and fell a

## 2020-09-25 ENCOUNTER — PATIENT MESSAGE (OUTPATIENT)
Dept: OBGYN CLINIC | Facility: CLINIC | Age: 40
End: 2020-09-25

## 2020-09-26 NOTE — TELEPHONE ENCOUNTER
From: Margaret Potter  To: Helen Inman MD  Sent: 9/25/2020 8:47 PM CDT  Subject: Other    I'm I allowed to get massages? I didn't remember if I had to wait to get cleared . thank you

## 2020-10-06 ENCOUNTER — POSTPARTUM (OUTPATIENT)
Dept: OBGYN CLINIC | Facility: CLINIC | Age: 40
End: 2020-10-06
Payer: COMMERCIAL

## 2020-10-06 VITALS
BODY MASS INDEX: 32 KG/M2 | HEART RATE: 78 BPM | WEIGHT: 193 LBS | DIASTOLIC BLOOD PRESSURE: 85 MMHG | SYSTOLIC BLOOD PRESSURE: 123 MMHG

## 2020-10-06 PROCEDURE — 90471 IMMUNIZATION ADMIN: CPT | Performed by: OBSTETRICS & GYNECOLOGY

## 2020-10-06 PROCEDURE — 3079F DIAST BP 80-89 MM HG: CPT | Performed by: OBSTETRICS & GYNECOLOGY

## 2020-10-06 PROCEDURE — 90686 IIV4 VACC NO PRSV 0.5 ML IM: CPT | Performed by: OBSTETRICS & GYNECOLOGY

## 2020-10-06 PROCEDURE — 3074F SYST BP LT 130 MM HG: CPT | Performed by: OBSTETRICS & GYNECOLOGY

## 2020-10-06 RX ORDER — NORETHINDRONE ACETATE AND ETHINYL ESTRADIOL 1; .02 MG/1; MG/1
1 TABLET ORAL DAILY
COMMUNITY
Start: 2020-09-23 | End: 2020-10-06

## 2020-10-06 RX ORDER — NORETHINDRONE ACETATE AND ETHINYL ESTRADIOL 1MG-20(21)
1 KIT ORAL DAILY
Qty: 3 PACKAGE | Refills: 1 | Status: SHIPPED | OUTPATIENT
Start: 2020-10-06 | End: 2021-04-29

## 2020-10-06 NOTE — PROGRESS NOTES
RITU    Tamie Tirado is a 47year old female [id number] here for 6 week post-partum visit. Patient delivered a  male infant on 20 by . Had A2GDM during pregnancy. Patient desires ocp for contraception.  planning vasectomy.       Patient i perineum  Anus: no hemorroids       ASSESSMENT/PLAN  Normal Post partum exam  6 month Rx Loestrin 1/20. Flu shot. Order for 2 hr GTT.   RTC annual.

## 2020-10-06 NOTE — PROGRESS NOTES
Flu vaccine administered to pts right deltoid. Pt tolerated injection well. VIS info sheet given to pt.

## 2020-10-17 ENCOUNTER — HOSPITAL ENCOUNTER (OUTPATIENT)
Age: 40
Discharge: HOME | End: 2020-10-17
Payer: COMMERCIAL

## 2020-10-17 ENCOUNTER — NURSE TRIAGE (OUTPATIENT)
Dept: INTERNAL MEDICINE CLINIC | Facility: CLINIC | Age: 40
End: 2020-10-17

## 2020-10-17 ENCOUNTER — PATIENT MESSAGE (OUTPATIENT)
Dept: INTERNAL MEDICINE CLINIC | Facility: CLINIC | Age: 40
End: 2020-10-17

## 2020-10-17 VITALS
OXYGEN SATURATION: 100 % | RESPIRATION RATE: 22 BRPM | HEART RATE: 80 BPM | TEMPERATURE: 97 F | SYSTOLIC BLOOD PRESSURE: 124 MMHG | DIASTOLIC BLOOD PRESSURE: 81 MMHG

## 2020-10-17 DIAGNOSIS — B34.9 VIRAL ILLNESS: Primary | ICD-10-CM

## 2020-10-17 DIAGNOSIS — Z20.822 ENCOUNTER FOR SCREENING LABORATORY TESTING FOR COVID-19 VIRUS: ICD-10-CM

## 2020-10-17 PROCEDURE — 99202 OFFICE O/P NEW SF 15 MIN: CPT | Performed by: NURSE PRACTITIONER

## 2020-10-17 PROCEDURE — U0003 INFECTIOUS AGENT DETECTION BY NUCLEIC ACID (DNA OR RNA); SEVERE ACUTE RESPIRATORY SYNDROME CORONAVIRUS 2 (SARS-COV-2) (CORONAVIRUS DISEASE [COVID-19]), AMPLIFIED PROBE TECHNIQUE, MAKING USE OF HIGH THROUGHPUT TECHNOLOGIES AS DESCRIBED BY CMS-2020-01-R: HCPCS | Performed by: NURSE PRACTITIONER

## 2020-10-17 NOTE — ED INITIAL ASSESSMENT (HPI)
Pt states having mild cold symptoms, along with loss of smell and taste and would like covid testing.

## 2020-10-17 NOTE — ED PROVIDER NOTES
Patient Seen in: [facility]      History   No chief complaint on file. Stated Complaint: LOSS TASTE SMELL COUGH    HPI    This is a 82-year-old female presenting with loss of smell and taste.   Patient states she has cold symptoms that sta LMP 10/01/2020   SpO2 100%         Physical Exam  Vitals signs and nursing note reviewed. Constitutional:       Appearance: Normal appearance. HENT:      Head: Normocephalic.       Right Ear: Tympanic membrane normal.      Left Ear: Tympanic membrane no Prescribed:  Current Discharge Medication List

## 2020-10-17 NOTE — TELEPHONE ENCOUNTER
See acute telephone encounter 10/17/20, already addressed. From: Iesha Whelan  To:  Radha Bob MD  Sent: 10/17/2020 11:12 AM CDT  Subject: Referral Request    I want to get the covid test done as soon as possible I have some symptoms and with a newbo

## 2020-10-17 NOTE — TELEPHONE ENCOUNTER
Action Requested: Summary for Provider     []  Critical Lab, Recommendations Needed  [] Need Additional Advice  [x]   FYI    []   Need Orders  [] Need Medications Sent to Pharmacy  []  Other     SUMMARY: For the past few days patient has been having nasal

## 2020-10-19 NOTE — TELEPHONE ENCOUNTER
Discharge       Home or Self Care        ED/IC AVS (Printed 10/17/2020)        Follow-Ups: Follow up with Akhil Deleon MD (Internal Medicine) in 1 week (10/24/2020)

## 2020-10-20 ENCOUNTER — TELEPHONE (OUTPATIENT)
Dept: INTERNAL MEDICINE CLINIC | Facility: CLINIC | Age: 40
End: 2020-10-20

## 2020-10-20 NOTE — TELEPHONE ENCOUNTER
I can try calling her at the end of the day for please refer to the Covid team to see if the nurses can help her with her questions  Please forward this back,

## 2020-10-20 NOTE — TELEPHONE ENCOUNTER
Pt contacted and asking how long to isolate for. Pt informed of criteria per CDC guidelines and voiced understanding. States symptoms started 7 days ago. Asking if retesting is needed to confirm no longer contagious after isolating appropropriately.  Inform

## 2020-10-20 NOTE — TELEPHONE ENCOUNTER
Patient called in wishing to only speak to Dr. Cee Arredondo regarding her Covid test results. She states that she has a few questions. Please advise.

## 2020-11-23 ENCOUNTER — TELEMEDICINE (OUTPATIENT)
Dept: INTERNAL MEDICINE CLINIC | Facility: CLINIC | Age: 40
End: 2020-11-23
Payer: COMMERCIAL

## 2020-11-23 ENCOUNTER — PATIENT MESSAGE (OUTPATIENT)
Dept: INTERNAL MEDICINE CLINIC | Facility: CLINIC | Age: 40
End: 2020-11-23

## 2020-11-23 DIAGNOSIS — Z86.32 HISTORY OF GESTATIONAL DIABETES: ICD-10-CM

## 2020-11-23 DIAGNOSIS — O24.414 INSULIN CONTROLLED GESTATIONAL DIABETES MELLITUS (GDM) IN THIRD TRIMESTER: ICD-10-CM

## 2020-11-23 DIAGNOSIS — R73.09 BLOOD SUGAR INCREASED: Primary | ICD-10-CM

## 2020-11-23 PROCEDURE — 99213 OFFICE O/P EST LOW 20 MIN: CPT | Performed by: INTERNAL MEDICINE

## 2020-11-23 NOTE — PROGRESS NOTES
Patient ID: Fabián Valentine is a 47year old female. Patient presents with:  Blood Sugar         HISTORY OF PRESENT ILLNESS:   Patient presents for above. This visit is conducted using Telemedicine with live, interactive video and audio.   Pt  Calls for f/u of children: Not on file      Years of education: Not on file      Highest education level: Not on file    Occupational History      Not on file    Social Needs      Financial resource strain: Not on file      Food insecurity        Worry: Not on file (GDM) in third trimester  - HEMOGLOBIN A1C; Future    3. History of gestational diabetes  - HEMOGLOBIN A1C; Future  Will check A1c and then if elevated restart Metformin    No follow-ups on file.     Time spent on encounter  7 minutes   Video time 7 minutes Coding/billing information is submitted for this visit based on complexity of care and/or time spent for the visit.     April Bowen MD  11/23/2020

## 2020-11-23 NOTE — TELEPHONE ENCOUNTER
Visit was completed. From: Yung Albarran  To: Julián Davis MD  Sent: 11/23/2020  3:38 PM CST  Subject: Other    Im on the video chart waiting for the doctor?

## 2020-11-27 ENCOUNTER — LAB ENCOUNTER (OUTPATIENT)
Dept: LAB | Age: 40
End: 2020-11-27
Attending: INTERNAL MEDICINE
Payer: COMMERCIAL

## 2020-11-27 DIAGNOSIS — Z86.32 HISTORY OF GESTATIONAL DIABETES: ICD-10-CM

## 2020-11-27 DIAGNOSIS — O24.414 INSULIN CONTROLLED GESTATIONAL DIABETES MELLITUS (GDM) IN THIRD TRIMESTER: ICD-10-CM

## 2020-11-27 DIAGNOSIS — R73.09 BLOOD SUGAR INCREASED: ICD-10-CM

## 2020-11-27 PROCEDURE — 36415 COLL VENOUS BLD VENIPUNCTURE: CPT

## 2020-11-27 PROCEDURE — 83036 HEMOGLOBIN GLYCOSYLATED A1C: CPT

## 2020-11-28 ENCOUNTER — PATIENT MESSAGE (OUTPATIENT)
Dept: INTERNAL MEDICINE CLINIC | Facility: CLINIC | Age: 40
End: 2020-11-28

## 2020-11-28 NOTE — TELEPHONE ENCOUNTER
From: Yulia Choudhary  To: Lamin Calvin MD  Sent: 11/28/2020 8:41 AM CST  Subject: Test Results Question    I have a question about HEMOGLOBIN A1C resulted on 11/27/20, 2:46 PM.  What does it mean since the number was high?

## 2020-12-28 ENCOUNTER — PATIENT MESSAGE (OUTPATIENT)
Dept: INTERNAL MEDICINE CLINIC | Facility: CLINIC | Age: 40
End: 2020-12-28

## 2020-12-28 DIAGNOSIS — Z86.16 HISTORY OF 2019 NOVEL CORONAVIRUS DISEASE (COVID-19): Primary | ICD-10-CM

## 2020-12-29 ENCOUNTER — PATIENT MESSAGE (OUTPATIENT)
Dept: INTERNAL MEDICINE CLINIC | Facility: CLINIC | Age: 40
End: 2020-12-29

## 2020-12-29 ENCOUNTER — LAB ENCOUNTER (OUTPATIENT)
Dept: LAB | Age: 40
End: 2020-12-29
Attending: INTERNAL MEDICINE
Payer: COMMERCIAL

## 2020-12-29 DIAGNOSIS — Z86.16 HISTORY OF 2019 NOVEL CORONAVIRUS DISEASE (COVID-19): ICD-10-CM

## 2020-12-29 LAB — SARS-COV-2 IGG+IGM SERPL QL IA: REACTIVE

## 2020-12-29 PROCEDURE — 86769 SARS-COV-2 COVID-19 ANTIBODY: CPT

## 2020-12-29 PROCEDURE — 36415 COLL VENOUS BLD VENIPUNCTURE: CPT

## 2020-12-29 NOTE — TELEPHONE ENCOUNTER
From: Cruz Speak  To: Margareth Poole MD  Sent: 12/28/2020 2:36 PM CST  Subject: Other    Would I be able to get a blood test done to see if I developed any anti bodies since I had covid? Almost 3 months ago ?

## 2020-12-30 NOTE — TELEPHONE ENCOUNTER
From: Cruz Speak  To: Margareth Poole MD  Sent: 12/29/2020 5:07 PM CST  Subject: Test Results Question    I have a question about SARS-COV-2 TOTAL ANTIBODY resulted on 12/29/20, 4:53 PM. What does this mean?

## 2020-12-30 NOTE — PATIENT INSTRUCTIONS
COVID-19 Antibody Test  Does this test have other names? SARS-CoV-2 serology test; COVID-19 serology test  What is this test?  This blood test checks if you had a COVID-19 infection in the past. COVID-19 is caused by a coronavirus called SARS-CoV-2.  The A negative test result means the test did not find these antibodies in your blood. This means you likely did not have a COVID-19 infection in the past. Or it could mean you had or have an infection, and your body hasn’t created antibodies yet.   Ask your he The accuracy of the test varies. It depends on several factors. This type of test is new, and there are tests from many testing companies right now. Some tests are approved by the FDA, but many are not.  Because of this, the proven accuracy of their results

## 2021-01-04 ENCOUNTER — TELEPHONE (OUTPATIENT)
Dept: INTERNAL MEDICINE CLINIC | Facility: CLINIC | Age: 41
End: 2021-01-04

## 2021-01-04 ENCOUNTER — PATIENT MESSAGE (OUTPATIENT)
Dept: INTERNAL MEDICINE CLINIC | Facility: CLINIC | Age: 41
End: 2021-01-04

## 2021-01-05 NOTE — TELEPHONE ENCOUNTER
Dr Jim Sunshine response copied from 1/4/21 MyChart encounter where pt sent the message now copied below:    Nolberto Leong MD     8:33 AM  Note     Yes , agree with TE , pt would need apt --this needs to be a  discussion   ty        Pt informed of Dr Julienne Negro

## 2021-01-05 NOTE — TELEPHONE ENCOUNTER
RN pls call pt and triage or offer ov if needed, thanks. From: Yasir Erickson  To: Aster Morales MD  Sent: 1/4/2021 10:32 AM CST  Subject: Other    Dr. Hawk Rivera been having a lot of trouble losing weight.  I've only lost the 20lbs 2 months after my

## 2021-01-05 NOTE — TELEPHONE ENCOUNTER
From: Felisha Rivera  To: Corby Varghese MD  Sent: 1/4/2021 10:32 AM CST  Subject: Other    Dr. Kailey Dominguez been having a lot of trouble losing weight.  I've only lost the 20lbs 2 months after my son was born but these last 2 months I haven't been able to los

## 2021-01-09 ENCOUNTER — OFFICE VISIT (OUTPATIENT)
Dept: INTERNAL MEDICINE CLINIC | Facility: CLINIC | Age: 41
End: 2021-01-09
Payer: COMMERCIAL

## 2021-01-09 ENCOUNTER — ORDER TRANSCRIPTION (OUTPATIENT)
Dept: ADMINISTRATIVE | Facility: HOSPITAL | Age: 41
End: 2021-01-09

## 2021-01-09 VITALS
DIASTOLIC BLOOD PRESSURE: 82 MMHG | WEIGHT: 199 LBS | HEART RATE: 94 BPM | HEIGHT: 65 IN | BODY MASS INDEX: 33.15 KG/M2 | RESPIRATION RATE: 16 BRPM | SYSTOLIC BLOOD PRESSURE: 128 MMHG

## 2021-01-09 DIAGNOSIS — E66.09 CLASS 1 OBESITY DUE TO EXCESS CALORIES WITH BODY MASS INDEX (BMI) OF 33.0 TO 33.9 IN ADULT: Primary | ICD-10-CM

## 2021-01-09 DIAGNOSIS — E66.09 CLASS 1 OBESITY DUE TO EXCESS CALORIES WITHOUT SERIOUS COMORBIDITY WITH BODY MASS INDEX (BMI) OF 33.0 TO 33.9 IN ADULT: ICD-10-CM

## 2021-01-09 DIAGNOSIS — Z00.00 PHYSICAL EXAM, ANNUAL: Primary | ICD-10-CM

## 2021-01-09 PROCEDURE — 3074F SYST BP LT 130 MM HG: CPT | Performed by: INTERNAL MEDICINE

## 2021-01-09 PROCEDURE — 3008F BODY MASS INDEX DOCD: CPT | Performed by: INTERNAL MEDICINE

## 2021-01-09 PROCEDURE — 3079F DIAST BP 80-89 MM HG: CPT | Performed by: INTERNAL MEDICINE

## 2021-01-09 PROCEDURE — 99395 PREV VISIT EST AGE 18-39: CPT | Performed by: INTERNAL MEDICINE

## 2021-01-09 RX ORDER — ERGOCALCIFEROL 1.25 MG/1
CAPSULE ORAL
COMMUNITY
Start: 2020-12-31

## 2021-01-09 NOTE — PROGRESS NOTES
Shraddha Peck is a 47year old female.   Patient presents with:  Weight Problem      HPI:   Patient comes for follow-up  C/C  C/o upset that she cannot lose weight-- lost wt gain having the baby but and gained again  She is watching what she eats but not exer of breath, cough, wheezing  CARDIOVASCULAR: denies chest pain on exertion, palpitations, swelling in feet  GI: denies abdominal pain and denies heartburn, nausea or vomiting  : No Pain on urination, change in the color of urine, discharge, urinating freq

## 2021-01-14 ENCOUNTER — LAB ENCOUNTER (OUTPATIENT)
Dept: LAB | Age: 41
End: 2021-01-14
Attending: INTERNAL MEDICINE
Payer: COMMERCIAL

## 2021-01-14 DIAGNOSIS — Z00.00 PHYSICAL EXAM, ANNUAL: ICD-10-CM

## 2021-01-14 LAB
ALBUMIN SERPL-MCNC: 3.5 G/DL (ref 3.4–5)
ALBUMIN/GLOB SERPL: 0.9 {RATIO} (ref 1–2)
ALP LIVER SERPL-CCNC: 61 U/L
ALT SERPL-CCNC: 26 U/L
ANION GAP SERPL CALC-SCNC: 4 MMOL/L (ref 0–18)
AST SERPL-CCNC: 16 U/L (ref 15–37)
BASOPHILS # BLD AUTO: 0.04 X10(3) UL (ref 0–0.2)
BASOPHILS NFR BLD AUTO: 0.6 %
BILIRUB SERPL-MCNC: 0.4 MG/DL (ref 0.1–2)
BUN BLD-MCNC: 10 MG/DL (ref 7–18)
BUN/CREAT SERPL: 13.9 (ref 10–20)
CALCIUM BLD-MCNC: 9.1 MG/DL (ref 8.5–10.1)
CHLORIDE SERPL-SCNC: 107 MMOL/L (ref 98–112)
CHOLEST SMN-MCNC: 232 MG/DL (ref ?–200)
CO2 SERPL-SCNC: 28 MMOL/L (ref 21–32)
CREAT BLD-MCNC: 0.72 MG/DL
DEPRECATED RDW RBC AUTO: 46.2 FL (ref 35.1–46.3)
EOSINOPHIL # BLD AUTO: 0.08 X10(3) UL (ref 0–0.7)
EOSINOPHIL NFR BLD AUTO: 1.1 %
ERYTHROCYTE [DISTWIDTH] IN BLOOD BY AUTOMATED COUNT: 17.2 % (ref 11–15)
GLOBULIN PLAS-MCNC: 4.1 G/DL (ref 2.8–4.4)
GLUCOSE BLD-MCNC: 100 MG/DL (ref 70–99)
HCT VFR BLD AUTO: 35.5 %
HDLC SERPL-MCNC: 45 MG/DL (ref 40–59)
HGB BLD-MCNC: 10.9 G/DL
IMM GRANULOCYTES # BLD AUTO: 0.01 X10(3) UL (ref 0–1)
IMM GRANULOCYTES NFR BLD: 0.1 %
LDLC SERPL CALC-MCNC: 148 MG/DL (ref ?–100)
LYMPHOCYTES # BLD AUTO: 1.64 X10(3) UL (ref 1–4)
LYMPHOCYTES NFR BLD AUTO: 23.1 %
M PROTEIN MFR SERPL ELPH: 7.6 G/DL (ref 6.4–8.2)
MCH RBC QN AUTO: 23 PG (ref 26–34)
MCHC RBC AUTO-ENTMCNC: 30.7 G/DL (ref 31–37)
MCV RBC AUTO: 74.9 FL
MONOCYTES # BLD AUTO: 0.47 X10(3) UL (ref 0.1–1)
MONOCYTES NFR BLD AUTO: 6.6 %
NEUTROPHILS # BLD AUTO: 4.85 X10 (3) UL (ref 1.5–7.7)
NEUTROPHILS # BLD AUTO: 4.85 X10(3) UL (ref 1.5–7.7)
NEUTROPHILS NFR BLD AUTO: 68.5 %
NONHDLC SERPL-MCNC: 187 MG/DL (ref ?–130)
OSMOLALITY SERPL CALC.SUM OF ELEC: 287 MOSM/KG (ref 275–295)
PATIENT FASTING Y/N/NP: YES
PATIENT FASTING Y/N/NP: YES
PLATELET # BLD AUTO: 234 10(3)UL (ref 150–450)
POTASSIUM SERPL-SCNC: 4 MMOL/L (ref 3.5–5.1)
RBC # BLD AUTO: 4.74 X10(6)UL
SODIUM SERPL-SCNC: 139 MMOL/L (ref 136–145)
TRIGL SERPL-MCNC: 195 MG/DL (ref 30–149)
TSI SER-ACNC: 1.52 MIU/ML (ref 0.36–3.74)
VLDLC SERPL CALC-MCNC: 39 MG/DL (ref 0–30)
WBC # BLD AUTO: 7.1 X10(3) UL (ref 4–11)

## 2021-01-14 PROCEDURE — 85025 COMPLETE CBC W/AUTO DIFF WBC: CPT

## 2021-01-14 PROCEDURE — 80061 LIPID PANEL: CPT

## 2021-01-14 PROCEDURE — 84443 ASSAY THYROID STIM HORMONE: CPT

## 2021-01-14 PROCEDURE — 36415 COLL VENOUS BLD VENIPUNCTURE: CPT

## 2021-01-14 PROCEDURE — 80053 COMPREHEN METABOLIC PANEL: CPT

## 2021-03-29 ENCOUNTER — PATIENT MESSAGE (OUTPATIENT)
Dept: OBGYN CLINIC | Facility: CLINIC | Age: 41
End: 2021-03-29

## 2021-03-30 NOTE — TELEPHONE ENCOUNTER
From: Jaqueline Barba  To: Jhonatan Vasques MD  Sent: 3/29/2021 9:03 PM CDT  Subject: Prescription Question    I'm on my last pack of birth control, would I be able to get a refill or would I need to schedule a pap already?

## 2021-04-12 ENCOUNTER — TELEPHONE (OUTPATIENT)
Dept: INTERNAL MEDICINE CLINIC | Facility: CLINIC | Age: 41
End: 2021-04-12

## 2021-04-12 ENCOUNTER — TELEPHONE (OUTPATIENT)
Dept: CARDIOLOGY CLINIC | Facility: CLINIC | Age: 41
End: 2021-04-12

## 2021-04-12 DIAGNOSIS — E55.9 VITAMIN D DEFICIENCY: Primary | ICD-10-CM

## 2021-04-13 NOTE — TELEPHONE ENCOUNTER
Yes, thank you  Ordered vitamin D  We will check and then refill if needed  In the meantime she can take vitamin D3 1000 international units daily-she can buy this over-the-counter

## 2021-04-13 NOTE — TELEPHONE ENCOUNTER
Spoke with patient relayed PCP message, patient verbalized understanding and agrees with plan of care.

## 2021-04-13 NOTE — TELEPHONE ENCOUNTER
Dr Cy Cuellar, patient is requesting Vitamin D supplement. Would you like patient to complete blood test prior to refilling medication?

## 2021-04-19 RX ORDER — ERGOCALCIFEROL 1.25 MG/1
50000 CAPSULE ORAL WEEKLY
Qty: 4 CAPSULE | Refills: 0 | OUTPATIENT
Start: 2021-04-19

## 2021-04-20 NOTE — TELEPHONE ENCOUNTER
[employer] message sent to patient, please verify it is viewed. If not, please call patient with the update.

## 2021-04-29 ENCOUNTER — OFFICE VISIT (OUTPATIENT)
Dept: OBGYN CLINIC | Facility: CLINIC | Age: 41
End: 2021-04-29
Payer: COMMERCIAL

## 2021-04-29 VITALS
SYSTOLIC BLOOD PRESSURE: 129 MMHG | DIASTOLIC BLOOD PRESSURE: 84 MMHG | HEIGHT: 65 IN | HEART RATE: 84 BPM | BODY MASS INDEX: 29.72 KG/M2 | WEIGHT: 178.38 LBS

## 2021-04-29 DIAGNOSIS — Z76.0 MEDICATION REFILL: ICD-10-CM

## 2021-04-29 DIAGNOSIS — Z01.419 WELL WOMAN EXAM: Primary | ICD-10-CM

## 2021-04-29 PROCEDURE — 3074F SYST BP LT 130 MM HG: CPT | Performed by: OBSTETRICS & GYNECOLOGY

## 2021-04-29 PROCEDURE — 99395 PREV VISIT EST AGE 18-39: CPT | Performed by: OBSTETRICS & GYNECOLOGY

## 2021-04-29 PROCEDURE — 3079F DIAST BP 80-89 MM HG: CPT | Performed by: OBSTETRICS & GYNECOLOGY

## 2021-04-29 PROCEDURE — 3008F BODY MASS INDEX DOCD: CPT | Performed by: OBSTETRICS & GYNECOLOGY

## 2021-04-29 RX ORDER — NORETHINDRONE ACETATE AND ETHINYL ESTRADIOL 1MG-20(21)
1 KIT ORAL DAILY
Qty: 3 PACKAGE | Refills: 3 | Status: SHIPPED | OUTPATIENT
Start: 2021-04-29

## 2021-04-29 NOTE — H&P
HPI:  The patient is a 45 yo F here for WWE. Monthly cycles on OCPs.  recently underwent vasectomy. Monogamous. LPS: 2/10/20-pap/hpv neg  Patient's last menstrual period was 04/28/2021.       Reviewed medical and surgical history below Transportation Needs:       Lack of Transportation (Medical):       Lack of Transportation (Non-Medical):   Physical Activity:       Days of Exercise per Week:       Minutes of Exercise per Session:   Stress:       Feeling of Stress :   Social Connection thyromegaly, no nodules, no adenopathy  Heart: Regular rate and rhythm   Lungs: clear to ascultation bilaterally   Lymphatic:no abnormal supraclavicular or axillary adenopathy is noted  Breast: normal without palpable masses, tenderness, asymmetry, nipple

## 2021-11-11 ENCOUNTER — LAB ENCOUNTER (OUTPATIENT)
Dept: LAB | Facility: HOSPITAL | Age: 41
End: 2021-11-11
Attending: INTERNAL MEDICINE
Payer: COMMERCIAL

## 2021-11-11 DIAGNOSIS — E55.9 VITAMIN D DEFICIENCY: ICD-10-CM

## 2021-11-11 DIAGNOSIS — R73.03 BORDERLINE DIABETES: ICD-10-CM

## 2021-11-11 DIAGNOSIS — D64.9 ANEMIA, UNSPECIFIED TYPE: ICD-10-CM

## 2021-11-11 PROCEDURE — 36415 COLL VENOUS BLD VENIPUNCTURE: CPT

## 2021-11-11 PROCEDURE — 85027 COMPLETE CBC AUTOMATED: CPT

## 2021-11-11 PROCEDURE — 83036 HEMOGLOBIN GLYCOSYLATED A1C: CPT

## 2021-11-11 PROCEDURE — 82306 VITAMIN D 25 HYDROXY: CPT

## 2021-12-20 ENCOUNTER — NURSE TRIAGE (OUTPATIENT)
Dept: INTERNAL MEDICINE CLINIC | Facility: CLINIC | Age: 41
End: 2021-12-20

## 2021-12-20 NOTE — TELEPHONE ENCOUNTER
Call patient and spoke to her  Sick since yest , on her period so not preg   Not able to keep anything down   Sounds A+O x 3 , NAD   Start with clear liquids and advance as tolerated  BRAT diet , bland diet  If not better since she is dizzy when getting up

## 2021-12-20 NOTE — TELEPHONE ENCOUNTER
Action Requested: Summary for Provider     []  Critical Lab, Recommendations Needed  [] Need Additional Advice  []   FYI    []   Need Orders  [] Need Medications Sent to Pharmacy  []  Other     SUMMARY:    Please advice.   Unable to do a video  visit for ha

## 2022-01-24 ENCOUNTER — TELEPHONE (OUTPATIENT)
Dept: LAB | Age: 42
End: 2022-01-24

## 2022-01-24 ENCOUNTER — TELEPHONE (OUTPATIENT)
Dept: OBGYN CLINIC | Facility: CLINIC | Age: 42
End: 2022-01-24

## 2022-01-24 NOTE — TELEPHONE ENCOUNTER
Received KENNY from Dr. Concha Hinson. Faxed to Medical Records department to address records transfer.

## 2022-03-14 ENCOUNTER — TELEPHONE (OUTPATIENT)
Dept: OBGYN CLINIC | Facility: CLINIC | Age: 42
End: 2022-03-14

## 2022-03-14 NOTE — TELEPHONE ENCOUNTER
Refill request received via fax from [facility] for pts OCP [facility]. Pts last annual was in 4/2021. No future appt made.  Request faxed back to [facility] denied with instructions for pt to call office to schedule annual.

## 2022-06-22 NOTE — TELEPHONE ENCOUNTER
From: Arminda Galdamez  To: Karon Esquivel MD  Sent: 6/21/2020 9:56 AM CDT  Subject: Test Results Question    I have a question about GENITAL VAGINOSIS SCREEN resulted on 6/21/20, 7:18 AM.    Is there something else I can do besides the otc thing.  I've tried
See other [employer] message from 6/21/2020.
15-Apr-2019

## (undated) DEVICE — CANISTER SAFETOUCH SYST DISP

## (undated) DEVICE — STIRRUP STRAP W/SLING RING

## (undated) DEVICE — TUBING SUCTION COLLECTION SET

## (undated) DEVICE — SUTURE VICRYL 0 UR-6

## (undated) DEVICE — ENCORE® LATEX ACCLAIM SIZE 6.5, STERILE LATEX POWDER-FREE SURGICAL GLOVE: Brand: ENCORE

## (undated) DEVICE — SOL  .9 1000ML BTL

## (undated) DEVICE — CANISTER SCT BTL SL CAP BRKLY

## (undated) DEVICE — CURRETTE 10MM CVD

## (undated) DEVICE — D AND C PACK: Brand: MEDLINE INDUSTRIES, INC.

## (undated) NOTE — LETTER
MINOR CASE LETTER      5/13/2019        Dear Margot Webb are having a dilation and curettage on 5/16/19 at 9:30am.    Do not eat or drink anything (including water) after midnight the night before surgery.     If your procedure is scheduled later in the d

## (undated) NOTE — LETTER
[address]      Authorization for Surgical Operation and Procedure     Date:8/27/20___________                                                                                                         Time: potential risks that can occur: fever and allergic reactions, hemolytic reactions, transmission of diseases such as Hepatitis, AIDS and Cytomegalovirus (CMV) and fluid overload.   In the event that I wish to have an autologous transfusion of my own blood, o attending physician will determine when the applicable recovery period ends for purposes of reinstating the DNAR order.   10. Patients having a sterilization procedure: I understand that if the procedure is successful the results will be permanent and it wi _______________________________________________________________ _____________________________  [facility] Physician)                                                                                         (Date)                                   (Time)

## (undated) NOTE — LETTER
[facility]  Administration of Anesthesia  1. [employer], or _________________________________ acting on her behalf, (Patient) (Dependent/Representative) request to receive anesthesia for my pending procedure/operation/treatment.   A quincy infections, high spinal block, spinal bleeding, seizure, cardiac arrest and death. 7. AWARENESS: I understand that it is possible (but unlikely) to have explicit memory of events from the operating room while under general anesthesia.   8. ELECTROCONVULSIV unconscious pt /Relationship    My signature below affirms that prior to the time of the procedure, I have explained to the patient and/or his/her guardian, the risks and benefits of undergoing anesthesia, as well as any reasonable alternatives.     _______

## (undated) NOTE — Clinical Note
3/27/2017              Janusz Carl        [id number] [facility] [id number]         Dear Ifrah Grissom,    I just spoke with you in regards to your 3 hour glucose that you will need to have completed.  You will need to call central scheduling

## (undated) NOTE — MR AVS SNAPSHOT
Ashley  [address]  [phone]               Thank you for choosing us for your health care visit with Nurse.   We are glad to serve you and happy to provide you with this summary of your vis RH BLOOD TYPE    Positive                ANTIBODY SCREEN      Component    Antibody Screen    Negative                CBC WITH DIFFERENTIAL WITH PLATELET           RUBELLA, IGG      Component Value Standard Range & Units    Rubella IgG Quantitative 16.3 >

## (undated) NOTE — LETTER
VACCINE ADMINISTRATION RECORD  PARENT / GUARDIAN APPROVAL  Date: 2020  Vaccine administered to: Iesha Whelan     : 1980    MRN: [id number]    A copy of the appropriate Centers for Disease Control and Prevention Vaccine Information statement has